# Patient Record
Sex: FEMALE | Race: WHITE | NOT HISPANIC OR LATINO | Employment: OTHER | URBAN - METROPOLITAN AREA
[De-identification: names, ages, dates, MRNs, and addresses within clinical notes are randomized per-mention and may not be internally consistent; named-entity substitution may affect disease eponyms.]

---

## 2017-02-27 PROBLEM — B00.9 HSV (HERPES SIMPLEX VIRUS) INFECTION: Status: ACTIVE | Noted: 2017-02-27

## 2017-02-27 PROBLEM — M94.9 DISORDER OF BONE AND CARTILAGE: Status: ACTIVE | Noted: 2017-02-27

## 2017-02-27 PROBLEM — M89.9 DISORDER OF BONE AND CARTILAGE: Status: ACTIVE | Noted: 2017-02-27

## 2017-02-27 PROBLEM — H26.9 CATARACT OF BOTH EYES, UNSPECIFIED CATARACT TYPE: Status: ACTIVE | Noted: 2017-02-27

## 2017-02-27 PROBLEM — N39.0 RECURRENT UTI: Status: ACTIVE | Noted: 2017-02-27

## 2017-02-27 PROCEDURE — 87086 URINE CULTURE/COLONY COUNT: CPT | Performed by: INTERNAL MEDICINE

## 2017-02-27 PROCEDURE — 81001 URINALYSIS AUTO W/SCOPE: CPT | Performed by: INTERNAL MEDICINE

## 2017-08-21 PROBLEM — H25.813 COMBINED FORMS OF AGE-RELATED CATARACT OF BOTH EYES: Status: ACTIVE | Noted: 2017-08-21

## 2018-05-29 PROCEDURE — 81001 URINALYSIS AUTO W/SCOPE: CPT | Performed by: INTERNAL MEDICINE

## 2018-05-29 PROCEDURE — 87086 URINE CULTURE/COLONY COUNT: CPT | Performed by: INTERNAL MEDICINE

## 2019-05-28 PROCEDURE — 81003 URINALYSIS AUTO W/O SCOPE: CPT | Performed by: INTERNAL MEDICINE

## 2020-06-16 ENCOUNTER — HOSPITAL ENCOUNTER (OUTPATIENT)
Dept: CT IMAGING | Age: 75
Discharge: HOME | End: 2020-06-16
Attending: INTERNAL MEDICINE

## 2020-06-16 DIAGNOSIS — E78.00 PURE HYPERCHOLESTEROLEMIA: ICD-10-CM

## 2020-06-16 PROCEDURE — 75571 CT HRT W/O DYE W/CA TEST: CPT

## 2020-08-06 PROCEDURE — 88305 TISSUE EXAM BY PATHOLOGIST: CPT | Performed by: INTERNAL MEDICINE

## 2021-01-27 PROBLEM — M54.81 CERVICO-OCCIPITAL NEURALGIA OF LEFT SIDE: Status: ACTIVE | Noted: 2021-01-27

## 2021-01-27 PROBLEM — M54.2 POSTERIOR NECK PAIN: Status: ACTIVE | Noted: 2021-01-27

## 2021-01-27 PROBLEM — M47.812 CERVICAL SPONDYLOSIS WITHOUT MYELOPATHY: Status: ACTIVE | Noted: 2021-01-27

## 2021-05-11 PROCEDURE — 88305 TISSUE EXAM BY PATHOLOGIST: CPT | Performed by: SURGERY

## 2021-06-07 PROBLEM — M31.6 TEMPORAL ARTERITIS (HCC): Status: ACTIVE | Noted: 2021-06-07

## 2021-06-07 PROBLEM — M54.81 CERVICO-OCCIPITAL NEURALGIA OF LEFT SIDE: Status: RESOLVED | Noted: 2021-01-27 | Resolved: 2021-06-07

## 2023-06-23 ENCOUNTER — LAB ENCOUNTER (OUTPATIENT)
Dept: LAB | Age: 78
End: 2023-06-23
Attending: ORTHOPAEDIC SURGERY
Payer: MEDICARE

## 2023-06-23 DIAGNOSIS — Z01.818 PRE-OP TESTING: ICD-10-CM

## 2023-06-23 LAB
ANTIBODY SCREEN: NEGATIVE
RH BLOOD TYPE: POSITIVE
RH BLOOD TYPE: POSITIVE

## 2023-06-23 PROCEDURE — 87641 MR-STAPH DNA AMP PROBE: CPT

## 2023-06-23 PROCEDURE — 36415 COLL VENOUS BLD VENIPUNCTURE: CPT

## 2023-06-23 PROCEDURE — 86850 RBC ANTIBODY SCREEN: CPT

## 2023-06-23 PROCEDURE — 86900 BLOOD TYPING SEROLOGIC ABO: CPT

## 2023-06-23 PROCEDURE — 86901 BLOOD TYPING SEROLOGIC RH(D): CPT

## 2023-06-23 RX ORDER — PREDNISONE 5 MG/1
5 TABLET ORAL DAILY
COMMUNITY

## 2023-06-23 RX ORDER — FAMOTIDINE 20 MG/1
20 TABLET, FILM COATED ORAL 2 TIMES DAILY
COMMUNITY
Start: 2023-03-31

## 2023-06-24 LAB — MRSA DNA SPEC QL NAA+PROBE: NEGATIVE

## 2023-06-27 ENCOUNTER — APPOINTMENT (OUTPATIENT)
Dept: GENERAL RADIOLOGY | Facility: HOSPITAL | Age: 78
End: 2023-06-27
Attending: ORTHOPAEDIC SURGERY
Payer: MEDICARE

## 2023-06-27 ENCOUNTER — HOSPITAL ENCOUNTER (OUTPATIENT)
Facility: HOSPITAL | Age: 78
Discharge: HOME HEALTH | End: 2023-06-29
Attending: ORTHOPAEDIC SURGERY | Admitting: ORTHOPAEDIC SURGERY
Payer: MEDICARE

## 2023-06-27 ENCOUNTER — ANESTHESIA EVENT (OUTPATIENT)
Dept: SURGERY | Facility: HOSPITAL | Age: 78
End: 2023-06-27
Payer: MEDICARE

## 2023-06-27 ENCOUNTER — ANESTHESIA (OUTPATIENT)
Dept: SURGERY | Facility: HOSPITAL | Age: 78
End: 2023-06-27
Payer: MEDICARE

## 2023-06-27 DIAGNOSIS — Z01.818 PRE-OP TESTING: Primary | ICD-10-CM

## 2023-06-27 PROCEDURE — C9113 INJ PANTOPRAZOLE SODIUM, VIA: HCPCS | Performed by: STUDENT IN AN ORGANIZED HEALTH CARE EDUCATION/TRAINING PROGRAM

## 2023-06-27 PROCEDURE — 88311 DECALCIFY TISSUE: CPT | Performed by: ORTHOPAEDIC SURGERY

## 2023-06-27 PROCEDURE — 0SRB04A REPLACEMENT OF LEFT HIP JOINT WITH CERAMIC ON POLYETHYLENE SYNTHETIC SUBSTITUTE, UNCEMENTED, OPEN APPROACH: ICD-10-PCS | Performed by: ORTHOPAEDIC SURGERY

## 2023-06-27 PROCEDURE — 76000 FLUOROSCOPY <1 HR PHYS/QHP: CPT | Performed by: ORTHOPAEDIC SURGERY

## 2023-06-27 PROCEDURE — 73501 X-RAY EXAM HIP UNI 1 VIEW: CPT | Performed by: ORTHOPAEDIC SURGERY

## 2023-06-27 PROCEDURE — 88305 TISSUE EXAM BY PATHOLOGIST: CPT | Performed by: ORTHOPAEDIC SURGERY

## 2023-06-27 DEVICE — ACTIS DUOFIX HIP PROSTHESIS (FEMORAL STEM 12/14 TAPER CEMENTLESS SIZE 7 STD COLLAR)  CE
Type: IMPLANTABLE DEVICE | Site: HIP | Status: FUNCTIONAL
Brand: ACTIS

## 2023-06-27 DEVICE — PINNACLE GRIPTION ACETABULAR SHELL SECTOR 50MM OD
Type: IMPLANTABLE DEVICE | Site: HIP | Status: FUNCTIONAL
Brand: PINNACLE GRIPTION

## 2023-06-27 DEVICE — TOTAL HIP W/POR CUP & COCR HD: Type: IMPLANTABLE DEVICE

## 2023-06-27 DEVICE — PINNACLE HIP SOLUTIONS ALTRX POLYETHYLENE ACETABULAR LINER +4 NEUTRAL 32MM ID 50MM OD
Type: IMPLANTABLE DEVICE | Site: HIP | Status: FUNCTIONAL
Brand: PINNACLE ALTRX

## 2023-06-27 DEVICE — BIOLOX DELTA CERAMIC FEMORAL HEAD 32MM DIA +5.0 12/14 TAPER
Type: IMPLANTABLE DEVICE | Site: HIP | Status: FUNCTIONAL
Brand: BIOLOX DELTA

## 2023-06-27 DEVICE — CERAMIC HEAD UPCHARGE: Type: IMPLANTABLE DEVICE

## 2023-06-27 RX ORDER — OXYCODONE HCL 10 MG/1
10 TABLET, FILM COATED, EXTENDED RELEASE ORAL ONCE
Status: COMPLETED | OUTPATIENT
Start: 2023-06-27 | End: 2023-06-27

## 2023-06-27 RX ORDER — ACETAMINOPHEN 500 MG
1000 TABLET ORAL ONCE
Status: COMPLETED | OUTPATIENT
Start: 2023-06-27 | End: 2023-06-27

## 2023-06-27 RX ORDER — MIDAZOLAM HYDROCHLORIDE 1 MG/ML
INJECTION INTRAMUSCULAR; INTRAVENOUS AS NEEDED
Status: DISCONTINUED | OUTPATIENT
Start: 2023-06-27 | End: 2023-06-27 | Stop reason: SURG

## 2023-06-27 RX ORDER — PHENYLEPHRINE HCL 10 MG/ML
VIAL (ML) INJECTION AS NEEDED
Status: DISCONTINUED | OUTPATIENT
Start: 2023-06-27 | End: 2023-06-27 | Stop reason: SURG

## 2023-06-27 RX ORDER — DIPHENHYDRAMINE HYDROCHLORIDE 50 MG/ML
12.5 INJECTION INTRAMUSCULAR; INTRAVENOUS EVERY 4 HOURS PRN
Status: DISCONTINUED | OUTPATIENT
Start: 2023-06-27 | End: 2023-06-29

## 2023-06-27 RX ORDER — ASPIRIN 81 MG/1
81 TABLET ORAL 2 TIMES DAILY
Status: DISCONTINUED | OUTPATIENT
Start: 2023-06-27 | End: 2023-06-29

## 2023-06-27 RX ORDER — CALCIUM CARBONATE-CHOLECALCIFEROL TAB 250 MG-125 UNIT 250-125 MG-UNIT
2 TAB ORAL
Status: DISCONTINUED | OUTPATIENT
Start: 2023-06-28 | End: 2023-06-29

## 2023-06-27 RX ORDER — CELECOXIB 200 MG/1
200 CAPSULE ORAL 2 TIMES DAILY
Status: DISCONTINUED | OUTPATIENT
Start: 2023-06-27 | End: 2023-06-29

## 2023-06-27 RX ORDER — TRANEXAMIC ACID 650 MG/1
1300 TABLET ORAL ONCE
Status: COMPLETED | OUTPATIENT
Start: 2023-06-27 | End: 2023-06-27

## 2023-06-27 RX ORDER — SODIUM CHLORIDE, SODIUM LACTATE, POTASSIUM CHLORIDE, CALCIUM CHLORIDE 600; 310; 30; 20 MG/100ML; MG/100ML; MG/100ML; MG/100ML
INJECTION, SOLUTION INTRAVENOUS CONTINUOUS
Status: DISCONTINUED | OUTPATIENT
Start: 2023-06-27 | End: 2023-06-27 | Stop reason: HOSPADM

## 2023-06-27 RX ORDER — HYDROMORPHONE HYDROCHLORIDE 1 MG/ML
0.4 INJECTION, SOLUTION INTRAMUSCULAR; INTRAVENOUS; SUBCUTANEOUS EVERY 5 MIN PRN
Status: DISCONTINUED | OUTPATIENT
Start: 2023-06-27 | End: 2023-06-27 | Stop reason: HOSPADM

## 2023-06-27 RX ORDER — HYDROMORPHONE HYDROCHLORIDE 1 MG/ML
0.2 INJECTION, SOLUTION INTRAMUSCULAR; INTRAVENOUS; SUBCUTANEOUS EVERY 5 MIN PRN
Status: DISCONTINUED | OUTPATIENT
Start: 2023-06-27 | End: 2023-06-27 | Stop reason: HOSPADM

## 2023-06-27 RX ORDER — POLYETHYLENE GLYCOL 3350 17 G/17G
17 POWDER, FOR SOLUTION ORAL DAILY PRN
Status: DISCONTINUED | OUTPATIENT
Start: 2023-06-27 | End: 2023-06-29

## 2023-06-27 RX ORDER — HYDROMORPHONE HYDROCHLORIDE 1 MG/ML
0.2 INJECTION, SOLUTION INTRAMUSCULAR; INTRAVENOUS; SUBCUTANEOUS EVERY 2 HOUR PRN
Status: DISCONTINUED | OUTPATIENT
Start: 2023-06-27 | End: 2023-06-28

## 2023-06-27 RX ORDER — SENNOSIDES 8.6 MG
17.2 TABLET ORAL NIGHTLY
Status: DISCONTINUED | OUTPATIENT
Start: 2023-06-27 | End: 2023-06-29

## 2023-06-27 RX ORDER — ONDANSETRON 2 MG/ML
INJECTION INTRAMUSCULAR; INTRAVENOUS AS NEEDED
Status: DISCONTINUED | OUTPATIENT
Start: 2023-06-27 | End: 2023-06-27 | Stop reason: SURG

## 2023-06-27 RX ORDER — ASPIRIN 81 MG/1
81 TABLET ORAL ONCE
Status: DISCONTINUED | OUTPATIENT
Start: 2023-06-27 | End: 2023-06-27 | Stop reason: HOSPADM

## 2023-06-27 RX ORDER — FOLIC ACID/VIT B COMPLEX AND C 400 MCG
1 TABLET ORAL DAILY
Status: DISCONTINUED | OUTPATIENT
Start: 2023-06-27 | End: 2023-06-29

## 2023-06-27 RX ORDER — SODIUM CHLORIDE, SODIUM LACTATE, POTASSIUM CHLORIDE, CALCIUM CHLORIDE 600; 310; 30; 20 MG/100ML; MG/100ML; MG/100ML; MG/100ML
INJECTION, SOLUTION INTRAVENOUS CONTINUOUS
Status: DISCONTINUED | OUTPATIENT
Start: 2023-06-27 | End: 2023-06-29

## 2023-06-27 RX ORDER — BUPIVACAINE HYDROCHLORIDE 7.5 MG/ML
INJECTION, SOLUTION INTRASPINAL AS NEEDED
Status: DISCONTINUED | OUTPATIENT
Start: 2023-06-27 | End: 2023-06-27 | Stop reason: SURG

## 2023-06-27 RX ORDER — DIPHENHYDRAMINE HCL 25 MG
25 CAPSULE ORAL EVERY 4 HOURS PRN
Status: DISCONTINUED | OUTPATIENT
Start: 2023-06-27 | End: 2023-06-29

## 2023-06-27 RX ORDER — OXYCODONE HYDROCHLORIDE 5 MG/1
5 TABLET ORAL EVERY 4 HOURS PRN
Status: DISCONTINUED | OUTPATIENT
Start: 2023-06-27 | End: 2023-06-28

## 2023-06-27 RX ORDER — FAMOTIDINE 20 MG/1
20 TABLET, FILM COATED ORAL 2 TIMES DAILY
Status: DISCONTINUED | OUTPATIENT
Start: 2023-06-27 | End: 2023-06-27

## 2023-06-27 RX ORDER — DOCUSATE SODIUM 100 MG/1
100 CAPSULE, LIQUID FILLED ORAL 2 TIMES DAILY
Status: DISCONTINUED | OUTPATIENT
Start: 2023-06-27 | End: 2023-06-29

## 2023-06-27 RX ORDER — HYDROMORPHONE HYDROCHLORIDE 1 MG/ML
0.6 INJECTION, SOLUTION INTRAMUSCULAR; INTRAVENOUS; SUBCUTANEOUS EVERY 5 MIN PRN
Status: DISCONTINUED | OUTPATIENT
Start: 2023-06-27 | End: 2023-06-27 | Stop reason: HOSPADM

## 2023-06-27 RX ORDER — HYDROMORPHONE HYDROCHLORIDE 1 MG/ML
0.4 INJECTION, SOLUTION INTRAMUSCULAR; INTRAVENOUS; SUBCUTANEOUS EVERY 2 HOUR PRN
Status: DISCONTINUED | OUTPATIENT
Start: 2023-06-27 | End: 2023-06-28

## 2023-06-27 RX ORDER — CEFAZOLIN SODIUM/WATER 2 G/20 ML
2 SYRINGE (ML) INTRAVENOUS ONCE
Status: COMPLETED | OUTPATIENT
Start: 2023-06-27 | End: 2023-06-27

## 2023-06-27 RX ORDER — ACYCLOVIR 400 MG/1
400 TABLET ORAL 2 TIMES DAILY
Status: DISCONTINUED | OUTPATIENT
Start: 2023-06-27 | End: 2023-06-29

## 2023-06-27 RX ORDER — FAMOTIDINE 10 MG/ML
20 INJECTION, SOLUTION INTRAVENOUS 2 TIMES DAILY
Status: DISCONTINUED | OUTPATIENT
Start: 2023-06-27 | End: 2023-06-29

## 2023-06-27 RX ORDER — ONDANSETRON 2 MG/ML
4 INJECTION INTRAMUSCULAR; INTRAVENOUS EVERY 6 HOURS PRN
Status: DISCONTINUED | OUTPATIENT
Start: 2023-06-27 | End: 2023-06-29

## 2023-06-27 RX ORDER — OXYCODONE HYDROCHLORIDE 5 MG/1
2.5 TABLET ORAL EVERY 4 HOURS PRN
Status: DISCONTINUED | OUTPATIENT
Start: 2023-06-27 | End: 2023-06-28

## 2023-06-27 RX ORDER — FAMOTIDINE 20 MG/1
20 TABLET, FILM COATED ORAL 2 TIMES DAILY
Status: DISCONTINUED | OUTPATIENT
Start: 2023-06-27 | End: 2023-06-29

## 2023-06-27 RX ORDER — CEFAZOLIN SODIUM/WATER 2 G/20 ML
2 SYRINGE (ML) INTRAVENOUS EVERY 8 HOURS
Status: COMPLETED | OUTPATIENT
Start: 2023-06-27 | End: 2023-06-28

## 2023-06-27 RX ORDER — VITAMIN E 268 MG
400 CAPSULE ORAL DAILY
Status: DISCONTINUED | OUTPATIENT
Start: 2023-06-27 | End: 2023-06-29

## 2023-06-27 RX ORDER — CELECOXIB 200 MG/1
200 CAPSULE ORAL ONCE
Status: COMPLETED | OUTPATIENT
Start: 2023-06-27 | End: 2023-06-27

## 2023-06-27 RX ORDER — METOCLOPRAMIDE HYDROCHLORIDE 5 MG/ML
10 INJECTION INTRAMUSCULAR; INTRAVENOUS EVERY 8 HOURS PRN
Status: DISCONTINUED | OUTPATIENT
Start: 2023-06-27 | End: 2023-06-27 | Stop reason: HOSPADM

## 2023-06-27 RX ORDER — ENEMA 19; 7 G/133ML; G/133ML
1 ENEMA RECTAL ONCE AS NEEDED
Status: DISCONTINUED | OUTPATIENT
Start: 2023-06-27 | End: 2023-06-29

## 2023-06-27 RX ORDER — NALOXONE HYDROCHLORIDE 0.4 MG/ML
80 INJECTION, SOLUTION INTRAMUSCULAR; INTRAVENOUS; SUBCUTANEOUS AS NEEDED
Status: DISCONTINUED | OUTPATIENT
Start: 2023-06-27 | End: 2023-06-27 | Stop reason: HOSPADM

## 2023-06-27 RX ORDER — METOCLOPRAMIDE HYDROCHLORIDE 5 MG/ML
10 INJECTION INTRAMUSCULAR; INTRAVENOUS EVERY 8 HOURS PRN
Status: DISCONTINUED | OUTPATIENT
Start: 2023-06-27 | End: 2023-06-29

## 2023-06-27 RX ORDER — ACETAMINOPHEN 500 MG
1000 TABLET ORAL EVERY 8 HOURS SCHEDULED
Status: DISCONTINUED | OUTPATIENT
Start: 2023-06-27 | End: 2023-06-29

## 2023-06-27 RX ORDER — MORPHINE SULFATE 4 MG/ML
2 INJECTION, SOLUTION INTRAMUSCULAR; INTRAVENOUS EVERY 10 MIN PRN
Status: DISCONTINUED | OUTPATIENT
Start: 2023-06-27 | End: 2023-06-27 | Stop reason: HOSPADM

## 2023-06-27 RX ORDER — MORPHINE SULFATE 4 MG/ML
4 INJECTION, SOLUTION INTRAMUSCULAR; INTRAVENOUS EVERY 10 MIN PRN
Status: DISCONTINUED | OUTPATIENT
Start: 2023-06-27 | End: 2023-06-27 | Stop reason: HOSPADM

## 2023-06-27 RX ORDER — ONDANSETRON 2 MG/ML
4 INJECTION INTRAMUSCULAR; INTRAVENOUS EVERY 6 HOURS PRN
Status: DISCONTINUED | OUTPATIENT
Start: 2023-06-27 | End: 2023-06-27 | Stop reason: HOSPADM

## 2023-06-27 RX ORDER — DIPHENHYDRAMINE HYDROCHLORIDE 50 MG/ML
25 INJECTION INTRAMUSCULAR; INTRAVENOUS ONCE AS NEEDED
Status: ACTIVE | OUTPATIENT
Start: 2023-06-27 | End: 2023-06-27

## 2023-06-27 RX ORDER — PROCHLORPERAZINE EDISYLATE 5 MG/ML
10 INJECTION INTRAMUSCULAR; INTRAVENOUS EVERY 6 HOURS PRN
Status: DISCONTINUED | OUTPATIENT
Start: 2023-06-27 | End: 2023-06-27 | Stop reason: ALTCHOICE

## 2023-06-27 RX ORDER — BISACODYL 10 MG
10 SUPPOSITORY, RECTAL RECTAL
Status: DISCONTINUED | OUTPATIENT
Start: 2023-06-27 | End: 2023-06-29

## 2023-06-27 RX ORDER — CALCIUM CARBONATE-CHOLECALCIFEROL TAB 250 MG-125 UNIT 250-125 MG-UNIT
1 TAB ORAL
Status: DISCONTINUED | OUTPATIENT
Start: 2023-06-27 | End: 2023-06-29

## 2023-06-27 RX ORDER — PREDNISONE 5 MG/1
5 TABLET ORAL DAILY
Status: DISCONTINUED | OUTPATIENT
Start: 2023-06-28 | End: 2023-06-29

## 2023-06-27 RX ORDER — ASCORBIC ACID 500 MG
500 TABLET ORAL DAILY
Status: DISCONTINUED | OUTPATIENT
Start: 2023-06-27 | End: 2023-06-29

## 2023-06-27 RX ORDER — MORPHINE SULFATE 10 MG/ML
6 INJECTION, SOLUTION INTRAMUSCULAR; INTRAVENOUS EVERY 10 MIN PRN
Status: DISCONTINUED | OUTPATIENT
Start: 2023-06-27 | End: 2023-06-27 | Stop reason: HOSPADM

## 2023-06-27 RX ADMIN — CEFAZOLIN SODIUM/WATER 2 G: 2 G/20 ML SYRINGE (ML) INTRAVENOUS at 12:22:00

## 2023-06-27 RX ADMIN — ONDANSETRON 4 MG: 2 INJECTION INTRAMUSCULAR; INTRAVENOUS at 12:27:00

## 2023-06-27 RX ADMIN — SODIUM CHLORIDE, SODIUM LACTATE, POTASSIUM CHLORIDE, CALCIUM CHLORIDE: 600; 310; 30; 20 INJECTION, SOLUTION INTRAVENOUS at 12:11:00

## 2023-06-27 RX ADMIN — PHENYLEPHRINE HCL 50 MCG: 10 MG/ML VIAL (ML) INJECTION at 12:50:00

## 2023-06-27 RX ADMIN — PHENYLEPHRINE HCL 50 MCG: 10 MG/ML VIAL (ML) INJECTION at 12:58:00

## 2023-06-27 RX ADMIN — SODIUM CHLORIDE, SODIUM LACTATE, POTASSIUM CHLORIDE, CALCIUM CHLORIDE: 600; 310; 30; 20 INJECTION, SOLUTION INTRAVENOUS at 13:50:00

## 2023-06-27 RX ADMIN — SODIUM CHLORIDE, SODIUM LACTATE, POTASSIUM CHLORIDE, CALCIUM CHLORIDE: 600; 310; 30; 20 INJECTION, SOLUTION INTRAVENOUS at 14:22:00

## 2023-06-27 RX ADMIN — PHENYLEPHRINE HCL 50 MCG: 10 MG/ML VIAL (ML) INJECTION at 12:42:00

## 2023-06-27 RX ADMIN — BUPIVACAINE HYDROCHLORIDE 1.5 ML: 7.5 INJECTION, SOLUTION INTRASPINAL at 12:22:00

## 2023-06-27 RX ADMIN — MIDAZOLAM HYDROCHLORIDE 1 MG: 1 INJECTION INTRAMUSCULAR; INTRAVENOUS at 12:17:00

## 2023-06-27 NOTE — OPERATIVE REPORT
[facility]    PATIENT'S NAME: Cinthya DEY   ATTENDING PHYSICIAN: Teddy Li MD   OPERATING PHYSICIAN: Teddy Li MD   PATIENT ACCOUNT#:   [id number]    LOCATION:   Room 2 A [facility]  MEDICAL RECORD #:   [id number]       YOB: 1945  ADMISSION DATE:       06/27/2023      OPERATION DATE:  06/27/2023    OPERATIVE REPORT      PREOPERATIVE DIAGNOSIS:  Left hip primary degenerative joint disease. POSTOPERATIVE DIAGNOSIS:  Left hip primary degenerative joint disease. PROCEDURE:  Left total hip arthroplasty. ASSISTANT:    1.   Seble Pham PA-C (PA helped with patient positioning, leg holding, prepping and draping, leg manipulation, implantation, and closure). 2.   SHAILESH Batista. ANESTHESIA:  Spinal with sedation. ESTIMATED BLOOD LOSS:  300 mL. BLOOD PRODUCTS:  None. SPECIMENS:  Femoral head. COMPLICATIONS:  None. IMPLANTS:  DePuy Actis size 7 standard collared stem; Warwick Gription acetabular shell 50 mm in diameter; +4 neutral polyethylene liner; and a Biolox Delta +5, 32 mm diameter ceramic femoral head. INDICATIONS:  Patient is a 58-year-old who has been suffering from progressive left hip pain not responsive to conservative measures, making it difficult for her to walk a few blocks or go up and down stairs. X-rays showed subchondral sclerosis, osteophytes, joint space narrowing. Based on the above factors, I recommended hip replacement. Risks and benefits were discussed. Major risks include, but are not limited to the following:  Bleeding, infection, blood clot, pulmonary embolus, leg length discrepancy, heterotopic ossification, pudendal nerve palsy, lateral calcaneus nerve injury, localized numbness, fracture, instability, continued hip pain, anesthetic risk, need for further surgery. All of her questions were answered and she expressed full understanding of my explanations and plan of care. Consent was obtained.     OPERATIVE TECHNIQUE:  The patient was met in the preoperative holding area and the correct site was identified with her assistance. She was then brought back to the operating room. Spinal was performed and she was sedated and placed on the Dalton table. Bony prominences were padded and limbs secured otherwise. Left hip was prepped and draped in the usual sterile fashion. Prior to beginning, a time-out was performed and preoperative antibiotics were infused. Direct anterior approach was utilized with a skin incision made with a #10 blade on the proximal anterolateral thigh. We dissected sharply down to the tensor fascia and incised that longitudinally and bluntly dissected into the Hunter-Chua interval.  Circumflex arteries were located and cauterized. A T-capsulotomy was performed with each limb of the capsule tagged with a #2 nonabsorbable suture. Femoral head and neck were exposed. Capsular releases were performed around the trochanters. Femoral neck cut was performed using fluoroscopy for guidance, and the head was extracted measuring 48 mm in diameter. Labrum was removed circumferentially. We began reaming with a 47 mm reamer and went up by 1 mm increments to a 50, using fluoroscopy for guidance and placed our 50 mm shell. We then placed a +4 neutral liner. We then turned our attention to the proximal femur. We externally rotated it as well as adducted and extended it. We performed further capsular releases to aid in visualizing the calcar. Box osteotome was used followed by initial broach and we went up by 1 size increments to a size 7. We calcar planed and trialed and found a good re-creation of leg length and offset with a standard neck and a +5 ceramic femoral head. Trial instrumentation from the femur was then removed and the true implants placed and reduced the hip again finding good parameters both fluoroscopically and dynamically, taking the hip through a range of motion to ensure stability. Layered closure was performed with the capsule closed over the implant using the tag sutures. The tensor fascia was closed with a running #2 nonabsorbable suture. A 2-0 Vicryl in interrupted fashion was used for the subcutaneous layer and staples for the skin. A sterile dressing was applied. The patient was then awakened, transferred to her cart, and taken to the recovery room in stable condition.      Dictated By Dani Posey MD  d: 06/27/2023 14:26:44  t: 06/27/2023 18:08:05  [facility] [id number]  RUBIO/

## 2023-06-27 NOTE — INTERVAL H&P NOTE
Pre-op Diagnosis: Left hip osteoarthritis    The above referenced H&P was reviewed by Khoa Bajwa MD on 6/27/2023, the patient was examined and no significant changes have occurred in the patient's condition since the H&P was performed. I discussed with the patient and/or legal representative the potential benefits, risks and side effects of this procedure; the likelihood of the patient achieving goals; and potential problems that might occur during recuperation. I discussed reasonable alternatives to the procedure, including risks, benefits and side effects related to the alternatives and risks related to not receiving this procedure. We will proceed with procedure as planned.

## 2023-06-27 NOTE — BRIEF OP NOTE
Pre-Operative Diagnosis: Left hip osteoarthritis     Post-Operative Diagnosis: Left hip osteoarthritis      Procedure Performed:   Left total hip replacement    Surgeon(s) and Role:     * Nidia Zepeda MD - Primary    Assistant(s):  Surgical Assistant.: Wilver Esparza  PA: Mariza Colon     Surgical Findings: As dictated     Specimen: femoral head     Estimated Blood Loss: Nisha Iglesias MD  6/27/2023  2:20 PM

## 2023-06-27 NOTE — CM/SW NOTE
AC followed up on DC planning. AC received message from [facility] stating pt is pre ortho with [facility] [phone]    Referral sent - orders/F2F entered    PLAN: Home with [address], [place], MSW ext.  [phone]

## 2023-06-27 NOTE — PROGRESS NOTES
Pharmacy Note: Dietary Supplement Discontinuation Per Policy    Vitamin A has been discontinued on Bettina Harris per policy. This supplement may be restarted upon discharge using the medication reconciliation process.     Thank you,   Silva Luo, PharmD  6/27/2023,  6:24 PM

## 2023-06-28 PROBLEM — Z01.818 PRE-OP TESTING: Status: RESOLVED | Noted: 2023-06-27 | Resolved: 2023-06-28

## 2023-06-28 PROBLEM — Z96.642 S/P HIP REPLACEMENT, LEFT: Status: ACTIVE | Noted: 2023-06-28

## 2023-06-28 LAB
ANION GAP SERPL CALC-SCNC: 5 MMOL/L (ref 0–18)
BUN BLD-MCNC: 10 MG/DL (ref 7–18)
BUN/CREAT SERPL: 20 (ref 10–20)
CALCIUM BLD-MCNC: 8.6 MG/DL (ref 8.5–10.1)
CHLORIDE SERPL-SCNC: 108 MMOL/L (ref 98–112)
CO2 SERPL-SCNC: 27 MMOL/L (ref 21–32)
CREAT BLD-MCNC: 0.5 MG/DL
DEPRECATED RDW RBC AUTO: 52.4 FL (ref 35.1–46.3)
ERYTHROCYTE [DISTWIDTH] IN BLOOD BY AUTOMATED COUNT: 14.6 % (ref 11–15)
GFR SERPLBLD BASED ON 1.73 SQ M-ARVRAT: 95 ML/MIN/1.73M2 (ref 60–?)
GLUCOSE BLD-MCNC: 110 MG/DL (ref 70–99)
HCT VFR BLD AUTO: 34.3 %
HGB BLD-MCNC: 11.3 G/DL
MCH RBC QN AUTO: 32.3 PG (ref 26–34)
MCHC RBC AUTO-ENTMCNC: 32.9 G/DL (ref 31–37)
MCV RBC AUTO: 98 FL
OSMOLALITY SERPL CALC.SUM OF ELEC: 290 MOSM/KG (ref 275–295)
PLATELET # BLD AUTO: 209 10(3)UL (ref 150–450)
POTASSIUM SERPL-SCNC: 4 MMOL/L (ref 3.5–5.1)
RBC # BLD AUTO: 3.5 X10(6)UL
SODIUM SERPL-SCNC: 140 MMOL/L (ref 136–145)
WBC # BLD AUTO: 8.8 X10(3) UL (ref 4–11)

## 2023-06-28 PROCEDURE — 85018 HEMOGLOBIN: CPT | Performed by: ORTHOPAEDIC SURGERY

## 2023-06-28 PROCEDURE — 97161 PT EVAL LOW COMPLEX 20 MIN: CPT

## 2023-06-28 PROCEDURE — 97116 GAIT TRAINING THERAPY: CPT

## 2023-06-28 PROCEDURE — 85027 COMPLETE CBC AUTOMATED: CPT | Performed by: STUDENT IN AN ORGANIZED HEALTH CARE EDUCATION/TRAINING PROGRAM

## 2023-06-28 PROCEDURE — 97166 OT EVAL MOD COMPLEX 45 MIN: CPT

## 2023-06-28 PROCEDURE — 97535 SELF CARE MNGMENT TRAINING: CPT

## 2023-06-28 PROCEDURE — 85014 HEMATOCRIT: CPT | Performed by: ORTHOPAEDIC SURGERY

## 2023-06-28 PROCEDURE — S0028 INJECTION, FAMOTIDINE, 20 MG: HCPCS | Performed by: ORTHOPAEDIC SURGERY

## 2023-06-28 PROCEDURE — 80048 BASIC METABOLIC PNL TOTAL CA: CPT | Performed by: ORTHOPAEDIC SURGERY

## 2023-06-28 RX ORDER — HYDROCODONE BITARTRATE AND ACETAMINOPHEN 5; 325 MG/1; MG/1
1 TABLET ORAL EVERY 12 HOURS PRN
Status: DISCONTINUED | OUTPATIENT
Start: 2023-06-28 | End: 2023-06-29

## 2023-06-28 RX ORDER — TRAMADOL HYDROCHLORIDE 50 MG/1
50 TABLET ORAL EVERY 6 HOURS PRN
Status: DISCONTINUED | OUTPATIENT
Start: 2023-06-28 | End: 2023-06-29

## 2023-06-28 NOTE — PROGRESS NOTES
06/28/23 1000   VISIT TYPE   OT Inpatient Visit Type (Documentation Required) Attempted Evaluation     OT orders received, chart reviewed. Pt cleared to participate by RN. 2 attempts made this morning to complete initial OT eval. Pt declined both  with c/o nausea and pain. Despite education and encouragement, pt refuses OOB activity. Nurse aware. Plan to re schedule for a later time today.

## 2023-06-28 NOTE — CM/SW NOTE
Jessica Soto confirmed acceptance for RN and PT services at [facility]. Agency reserved in [address] and notified of possible dc today. Plan: Home w/[facility] pending medical clearance.     Kristina Stephens, BSN    [phone]

## 2023-06-28 NOTE — PHYSICAL THERAPY NOTE
PHYSICAL THERAPY EVALUATION - INPATIENT     Room Number: Room 2/Room 2-A  Evaluation Date: 6/28/2023  Type of Evaluation: Initial        Presenting Problem:  (L AMAURY)  Reason for Therapy: Mobility Dysfunction and Discharge Planning    PHYSICAL THERAPY ASSESSMENT   Orders received and chart reviewed. RN approved participation in physical therapy. PPE worn by therapist: mask and gloves. Patient was wearing a mask during session. Patient is a 76year old female admitted 6/27/2023 for Presenting Problem:  (L AMAURY). Patient presented in bed with 8/10 pain. Education provided on Total Hip precautions, Weight bearing status, Physical therapy plan of care, and physiological benefits of out of bed mobility. Patient with fair carryover. Bed mobility: Min assist  Transfers: Supervision  Gait Assistance: Supervision  Distance (ft): 200  Assistive Device: Rolling walker  Pattern: L Circumduction, valgus deformity bilaterally, guarded gait pattern despite verbal cues for heel toe gait patter to improve left knee flexion          Patient was left in bed and bedside chair at end of session with all needs in reach. Patient's current functional deficits include The patient's Approx Degree of Impairment: 46.58% has been calculated based on documentation in the [facility] '6 clicks' Inpatient Basic Mobility Short Form. Research supports that patients with this level of impairment may benefit from Home with home health PT. RN aware of patient status post session. Patient will benefit from continued IP PT services to address these deficits in preparation for discharge. DISCHARGE RECOMMENDATIONS  PT Discharge Recommendations: Home with home health PT    PLAN  PT Treatment Plan: Bed mobility; Family education;Patient education;Gait training  Rehab Potential : Fair  Frequency (Obs): Daily       PHYSICAL THERAPY MEDICAL/SOCIAL HISTORY     History related to current admission:      Problem List  Active Problems:    S/P hip replacement, left      Past Medical History  Past Medical History:   Diagnosis Date    Congenital spondylolisthesis     DDD (degenerative disc disease), cervical 2015 MRI    Diverticulosis of colon (without mention of hemorrhage)     HSV infection     Hx of motion sickness     Immunization, single disease 06/22/2015    Prevnar 13    Osteoarthritis     Osteoarthrosis involving or with mention of more than one site, but not specified as generalized     Osteopenia DEXA 7/2016    -1.1, -1.8, -1.8    Screening for cardiovascular condition coronary calcium score at [facility]    score of 58; mild coronary artery plaque (score of 1-99)    Temporal arteritis ([address] 2021    Tinnitus        Past Surgical History  Past Surgical History:   Procedure Laterality Date    COLONOSCOPY  8/6    COLONOSCOPY  8/3/09    Performed by [facility] at [address] [place] [address]    COLONOSCOPY N/A 8/19/2014    Procedure: COLONOSCOPY, POSSIBLE BIOPSY, POSSIBLE POLYPECTOMY 18644;  Surgeon: Raul Vaughn MD;  Location: [address] [place] [address]    COLONOSCOPY  8/19/2014    repeat in 5 years (FH of colon CA)    COLONOSCOPY  08/2020    and EGD (per Dr. Gerson Gates)    EGD N/A 8/6/2020    Procedure: ESOPHAGOGASTRODUODENOSCOPY, COLONOSCOPY, POSSIBLE BIOPSY, POSSIBLE POLYPECTOMY 61464, 23036;  Surgeon: Raul Vaughn MD;  Location: [address] [place] [address]      and [facility]    OTHER SURGICAL HISTORY      h/o right knee arthroscopic surgery    REMOVAL OF TONSILS,10+ Y/O      TOTAL ABDOM HYSTERECTOMY         HOME SITUATION  Type of Home: House   Home Layout: Multi-level; Able to live on main level (3 JALYN no rails)              Lives With: Alone (dtr to stay with pt \"as long as necessary\")  Drives: Yes     Patient Regularly Uses: None    Prior Level of [place]: ind    SUBJECTIVE  \"I don't feel good\"    PHYSICAL THERAPY EXAMINATION     OBJECTIVE  Precautions: AMAURY - anterior  Fall Risk: Standard fall risk    WEIGHT BEARING RESTRICTION  Weight Bearing Restriction: L lower extremity           L Lower Extremity: Weight Bearing as Tolerated    PAIN ASSESSMENT  Ratin          COGNITION  Overall Cognitive Status:  WFL - within functional limits    RANGE OF MOTION AND STRENGTH ASSESSMENT  Upper extremity ROM and strength are within functional limits     Lower extremity ROM is within functional limits     Lower extremity strength is within functional limits     BALANCE  Static Sitting: Good  Dynamic Sitting: Good  Static Standing: Fair -  Dynamic [place] 2 '6-Clicks' INPATIENT SHORT FORM - BASIC MOBILITY  How much difficulty does the patient currently have. .. Patient Difficulty: Turning over in bed (including adjusting bedclothes, sheets and blankets)?: A Little   Patient Difficulty: Sitting down on and standing up from a chair with arms (e.g., wheelchair, bedside commode, etc.): A Little   Patient Difficulty: Moving from lying on back to sitting on the side of the bed?: A Little   How much help from another person does the patient currently need. ..    Help from Another: Moving to and from a bed to a chair (including a wheelchair)?: A Little   Help from Another: Need to walk in hospital room?: A Little   Help from Another: Climbing 3-5 steps with a railing?: A Little     AM-PAC Score:  Raw Score: 18   Approx Degree of Impairment: 46.58%   Standardized Score (AM-PAC Scale): 43.63   CMS Modifier (G-Code): CK      Exercise/Education Provided:  Bed mobility  Gait training  Transfer training    Patient End of Session: Up in chair    CURRENT GOALS    Goals to be met by:   Patient Goal Patient's self-stated goal is: home   Goal #1 Patient is able to demonstrate supine - sit EOB @ level: supervision     Goal #1   Current Status    Goal #2 Patient is able to demonstrate transfers Sit to/from Stand at assistance level: supervision with walker - rolling     Goal #2  Current Status    Goal #3 Patient is able to ambulate 300 feet with assist device: walker - rolling at assistance level: supervision   Goal #3   Current Status    Goal #4 Patient will negotiate 3 stairs/one curb w/ assistive device and supervision   Goal #4   Current Status    Goal #5 Patient to demonstrate independence with home activity/exercise instructions provided to patient in preparation for discharge.    Goal #5   Current Status    Goal #6    Goal #6  Current Status          Patient Evaluation Complexity Level:  History Low - no personal factors and/or co-morbidities   Examination of body systems Low - addressing 1-2 elements   Clinical Presentation Low - Stable   Clinical Decision Making Low Complexity       Gait Trainin minutes  Therapeutic Exercise: 5 minutes

## 2023-06-29 VITALS
HEART RATE: 100 BPM | BODY MASS INDEX: 21.51 KG/M2 | OXYGEN SATURATION: 98 % | DIASTOLIC BLOOD PRESSURE: 43 MMHG | SYSTOLIC BLOOD PRESSURE: 131 MMHG | WEIGHT: 126 LBS | TEMPERATURE: 98 F | HEIGHT: 64 IN | RESPIRATION RATE: 18 BRPM

## 2023-06-29 PROCEDURE — 97116 GAIT TRAINING THERAPY: CPT

## 2023-06-29 PROCEDURE — 97535 SELF CARE MNGMENT TRAINING: CPT

## 2023-06-29 PROCEDURE — 97530 THERAPEUTIC ACTIVITIES: CPT

## 2023-06-29 RX ORDER — CELECOXIB 200 MG/1
200 CAPSULE ORAL 2 TIMES DAILY
Qty: 60 CAPSULE | Refills: 0 | Status: SHIPPED | OUTPATIENT
Start: 2023-06-29 | End: 2023-07-29

## 2023-06-29 RX ORDER — ASPIRIN 81 MG/1
81 TABLET ORAL 2 TIMES DAILY
Qty: 42 TABLET | Refills: 0 | Status: SHIPPED | OUTPATIENT
Start: 2023-06-29 | End: 2023-07-20

## 2023-06-29 RX ORDER — ACETAMINOPHEN 500 MG
1000 TABLET ORAL 3 TIMES DAILY PRN
Qty: 42 TABLET | Refills: 0 | Status: SHIPPED | OUTPATIENT
Start: 2023-06-29 | End: 2023-07-06

## 2023-06-29 RX ORDER — TAMSULOSIN HYDROCHLORIDE 0.4 MG/1
0.4 CAPSULE ORAL DAILY
Qty: 7 CAPSULE | Refills: 0 | Status: SHIPPED | OUTPATIENT
Start: 2023-06-29

## 2023-06-29 RX ORDER — TRAMADOL HYDROCHLORIDE 50 MG/1
TABLET ORAL EVERY 8 HOURS PRN
Qty: 21 TABLET | Refills: 0 | Status: SHIPPED | OUTPATIENT
Start: 2023-06-29 | End: 2023-07-06

## 2023-06-29 NOTE — PROGRESS NOTES
Contact by nursing over EPIC, patient cleared by PT and is appropriate for discharge home with home health. Tylenol and tramadol for pain control as she did not do well with Norco or Percocet. ASA for DVT proph  Celebrex  Follow up with my PA Lionel Tavarez in approximately 2 weeks  I will try to see the patient around 1p, but if she is ready and doing well, can be discharged earlier.

## 2023-06-29 NOTE — PHYSICAL THERAPY NOTE
PHYSICAL THERAPY TREATMENT NOTE - INPATIENT     Room Number: Room 2/Room 2-A       Presenting Problem:  (L AMAURY)    Problem List  Active Problems:    S/P hip replacement, left      PHYSICAL THERAPY ASSESSMENT   Chart reviewed. RN Karlo Pan approved participation in physical therapy. Seb Lipoma PPE worn by therapist: mask and gloves. Patient was not wearing a mask during session. Patient presented in bedside chair with 6/10 pain. Patient with good  progress towards goals during this session. Education provided on Total Hip precautions, Physical therapy plan of care, and physiological benefits of out of bed mobility. Patient with good carryover. Pt is received in the chair and was cleared for therapy session. Pt is SBA with sit<>stand transfers with the RW Pt was cued for proper hand placement for safety. Pt was able to AMB about 100' with the RW SBA. Pt with slow gait and decreased step length with narrow CHACE but with good balance and safety awareness. Pt AMB to the stairs for stair training. Pt was educated and able to negotiate 4 stairs with 2 HR's CGA. Pt is cued for proper technique and safety awareness. Pt with good balance on the stairs. Returned pt back to the room and to sitting in the chair with all needs within reach. Pt is on track to dc to home once medically cleared. Bed mobility:  NT  Transfers: Supervision  Gait Assistance: Supervision  Distance (ft): 100'  Assistive Device: Rolling walker  Pattern: L Decreased stance time;L Circumduction        . Patient was left in bedside chair at end of session with all needs in reach. The patient's Approx Degree of Impairment: 46.58% has been calculated based on documentation in the [facility] '6 clicks' Inpatient Basic Mobility Short Form. Research supports that patients with this level of impairment may benefit from Home with home health PT. RN aware of patient status post session.     DISCHARGE RECOMMENDATIONS  PT Discharge Recommendations: Home with home health PT;24 hour care/supervision     PLAN  PT Treatment Plan: Bed mobility; Family education;Patient education;Gait training    SUBJECTIVE  Pt was agreeable to therapy session. OBJECTIVE  Precautions: AMAURY - anterior    WEIGHT BEARING RESTRICTION  Weight Bearing Restriction: L lower extremity           L Lower Extremity: Weight Bearing as Tolerated    PAIN ASSESSMENT   Ratin  Location: L hip  Management Techniques: Activity promotion; Body mechanics; Relaxation;Repositioning    BALANCE                                                                                                                       Static Sitting: Good  Dynamic Sitting: Fair +           Static Standing: Fair  Dynamic Standing: Fair -    ACTIVITY TOLERANCE                         O2 WALK       AM-PAC '6-Clicks' INPATIENT SHORT FORM - BASIC MOBILITY  How much difficulty does the patient currently have. .. Patient Difficulty: Turning over in bed (including adjusting bedclothes, sheets and blankets)?: A Little   Patient Difficulty: Sitting down on and standing up from a chair with arms (e.g., wheelchair, bedside commode, etc.): A Little   Patient Difficulty: Moving from lying on back to sitting on the side of the bed?: A Little   How much help from another person does the patient currently need. .. Help from Another: Moving to and from a bed to a chair (including a wheelchair)?: A Little   Help from Another: Need to walk in hospital room?: A Little   Help from Another: Climbing 3-5 steps with a railing?: A Little     AM-PAC Score:  Raw Score: 18   Approx Degree of Impairment: 46.58%   Standardized Score (AM-PAC Scale): 43.63   CMS Modifier (G-Code): CK          Patient End of Session: Up in chair;Needs met;Call light within reach;RN aware of session/findings; All patient questions and concerns addressed    CURRENT GOALS   Goals to be met by:   Patient Goal Patient's self-stated goal is: home   Goal #1 Patient is able to demonstrate supine - sit EOB @ level: supervision     Goal #1   Current Status NT received in the chair   Goal #2 Patient is able to demonstrate transfers Sit to/from Stand at assistance level: supervision with walker - rolling     Goal #2  Current Status SBA with the RW   Goal #3 Patient is able to ambulate 300 feet with assist device: walker - rolling at assistance level: supervision   Goal #3   Current Status 100' with the RW SBA   Goal #4 Patient will negotiate 3 stairs/one curb w/ assistive device and supervision   Goal #4   Current Status 4 stairs with 2 HR's CGA   Goal #5 Patient to demonstrate independence with home activity/exercise instructions provided to patient in preparation for discharge.    Goal #5   Current Status IN PROGRESS   Goal #6    Goal #6  Current Status        Gait Training: 10 minutes  Therapeutic Activity: 15 minutes

## 2024-06-25 ENCOUNTER — IMAGING SERVICES (OUTPATIENT)
Dept: OTHER | Age: 79
End: 2024-06-25

## 2024-07-03 ENCOUNTER — IMAGING SERVICES (OUTPATIENT)
Dept: OTHER | Age: 79
End: 2024-07-03

## 2024-11-11 NOTE — CM/SW NOTE
SW was notified the pt. Has been pre-operatively arranged with [facility].    [facility] will continue to follow.     LULU Loja ext [phone]

## 2024-11-12 RX ORDER — CARVEDILOL 6.25 MG/1
6.25 TABLET ORAL 2 TIMES DAILY
COMMUNITY
Start: 2024-08-08 | End: 2025-02-04

## 2024-11-14 ENCOUNTER — APPOINTMENT (OUTPATIENT)
Dept: GENERAL RADIOLOGY | Facility: HOSPITAL | Age: 79
End: 2024-11-14
Attending: PHYSICIAN ASSISTANT
Payer: MEDICARE

## 2024-11-14 ENCOUNTER — ANESTHESIA EVENT (OUTPATIENT)
Dept: SURGERY | Facility: HOSPITAL | Age: 79
End: 2024-11-14
Payer: MEDICARE

## 2024-11-14 ENCOUNTER — HOSPITAL ENCOUNTER (OUTPATIENT)
Facility: HOSPITAL | Age: 79
Setting detail: HOSPITAL OUTPATIENT SURGERY
Discharge: HOME | End: 2024-11-14
Attending: ORTHOPAEDIC SURGERY | Admitting: ORTHOPAEDIC SURGERY
Payer: MEDICARE

## 2024-11-14 ENCOUNTER — ANESTHESIA (OUTPATIENT)
Dept: SURGERY | Facility: HOSPITAL | Age: 79
End: 2024-11-14
Payer: MEDICARE

## 2024-11-14 VITALS
HEART RATE: 116 BPM | TEMPERATURE: 97 F | BODY MASS INDEX: 23.9 KG/M2 | SYSTOLIC BLOOD PRESSURE: 160 MMHG | HEIGHT: 64 IN | RESPIRATION RATE: 20 BRPM | OXYGEN SATURATION: 97 % | DIASTOLIC BLOOD PRESSURE: 78 MMHG | WEIGHT: 140 LBS

## 2024-11-14 PROCEDURE — 97530 THERAPEUTIC ACTIVITIES: CPT

## 2024-11-14 PROCEDURE — 73560 X-RAY EXAM OF KNEE 1 OR 2: CPT | Performed by: PHYSICIAN ASSISTANT

## 2024-11-14 PROCEDURE — 0SRC0J9 REPLACEMENT OF RIGHT KNEE JOINT WITH SYNTHETIC SUBSTITUTE, CEMENTED, OPEN APPROACH: ICD-10-PCS | Performed by: ORTHOPAEDIC SURGERY

## 2024-11-14 PROCEDURE — 97165 OT EVAL LOW COMPLEX 30 MIN: CPT

## 2024-11-14 PROCEDURE — 97116 GAIT TRAINING THERAPY: CPT

## 2024-11-14 PROCEDURE — 97161 PT EVAL LOW COMPLEX 20 MIN: CPT

## 2024-11-14 PROCEDURE — 64447 NJX AA&/STRD FEMORAL NRV IMG: CPT | Performed by: ORTHOPAEDIC SURGERY

## 2024-11-14 PROCEDURE — 88311 DECALCIFY TISSUE: CPT | Performed by: ORTHOPAEDIC SURGERY

## 2024-11-14 PROCEDURE — 97535 SELF CARE MNGMENT TRAINING: CPT

## 2024-11-14 PROCEDURE — 88305 TISSUE EXAM BY PATHOLOGIST: CPT | Performed by: ORTHOPAEDIC SURGERY

## 2024-11-14 PROCEDURE — 8E0Y0CZ ROBOTIC ASSISTED PROCEDURE OF LOWER EXTREMITY, OPEN APPROACH: ICD-10-PCS | Performed by: ORTHOPAEDIC SURGERY

## 2024-11-14 DEVICE — IMPLANTABLE DEVICE
Type: IMPLANTABLE DEVICE | Site: KNEE | Status: FUNCTIONAL
Brand: BIOMET® BONE CEMENT R

## 2024-11-14 DEVICE — IMPLANTABLE DEVICE
Type: IMPLANTABLE DEVICE | Site: KNEE | Status: FUNCTIONAL
Brand: PERSONA®

## 2024-11-14 DEVICE — IMPLANTABLE DEVICE
Type: IMPLANTABLE DEVICE | Site: KNEE | Status: FUNCTIONAL
Brand: PERSONA® VIVACIT-E®

## 2024-11-14 DEVICE — IMPLANTABLE DEVICE
Type: IMPLANTABLE DEVICE | Site: KNEE | Status: FUNCTIONAL
Brand: PERSONA® NATURAL TIBIA®

## 2024-11-14 RX ORDER — ACETAMINOPHEN 500 MG
1000 TABLET ORAL ONCE
Status: DISCONTINUED | OUTPATIENT
Start: 2024-11-14 | End: 2024-11-14 | Stop reason: HOSPADM

## 2024-11-14 RX ORDER — DIPHENHYDRAMINE HYDROCHLORIDE 50 MG/ML
12.5 INJECTION INTRAMUSCULAR; INTRAVENOUS EVERY 4 HOURS PRN
OUTPATIENT
Start: 2024-11-14

## 2024-11-14 RX ORDER — DOCUSATE SODIUM 100 MG/1
100 CAPSULE, LIQUID FILLED ORAL 2 TIMES DAILY
OUTPATIENT
Start: 2024-11-14

## 2024-11-14 RX ORDER — BUPIVACAINE HYDROCHLORIDE 7.5 MG/ML
INJECTION, SOLUTION INTRASPINAL AS NEEDED
Status: DISCONTINUED | OUTPATIENT
Start: 2024-11-14 | End: 2024-11-14 | Stop reason: SURG

## 2024-11-14 RX ORDER — ONDANSETRON 2 MG/ML
INJECTION INTRAMUSCULAR; INTRAVENOUS AS NEEDED
Status: DISCONTINUED | OUTPATIENT
Start: 2024-11-14 | End: 2024-11-14 | Stop reason: SURG

## 2024-11-14 RX ORDER — LIDOCAINE HYDROCHLORIDE 10 MG/ML
INJECTION, SOLUTION INFILTRATION; PERINEURAL
Status: COMPLETED | OUTPATIENT
Start: 2024-11-14 | End: 2024-11-14

## 2024-11-14 RX ORDER — HYDROMORPHONE HYDROCHLORIDE 1 MG/ML
0.4 INJECTION, SOLUTION INTRAMUSCULAR; INTRAVENOUS; SUBCUTANEOUS EVERY 5 MIN PRN
Status: DISCONTINUED | OUTPATIENT
Start: 2024-11-14 | End: 2024-11-14

## 2024-11-14 RX ORDER — DIPHENHYDRAMINE HCL 25 MG
25 CAPSULE ORAL EVERY 4 HOURS PRN
OUTPATIENT
Start: 2024-11-14

## 2024-11-14 RX ORDER — EPHEDRINE SULFATE 50 MG/ML
INJECTION INTRAVENOUS AS NEEDED
Status: DISCONTINUED | OUTPATIENT
Start: 2024-11-14 | End: 2024-11-14 | Stop reason: SURG

## 2024-11-14 RX ORDER — MORPHINE SULFATE 4 MG/ML
4 INJECTION, SOLUTION INTRAMUSCULAR; INTRAVENOUS EVERY 10 MIN PRN
Status: DISCONTINUED | OUTPATIENT
Start: 2024-11-14 | End: 2024-11-14

## 2024-11-14 RX ORDER — TRANEXAMIC ACID 650 MG/1
1300 TABLET ORAL ONCE
Status: COMPLETED | OUTPATIENT
Start: 2024-11-14 | End: 2024-11-14

## 2024-11-14 RX ORDER — FAMOTIDINE 20 MG/1
20 TABLET, FILM COATED ORAL 2 TIMES DAILY
OUTPATIENT
Start: 2024-11-14

## 2024-11-14 RX ORDER — BISACODYL 10 MG
10 SUPPOSITORY, RECTAL RECTAL
OUTPATIENT
Start: 2024-11-14

## 2024-11-14 RX ORDER — HYDROCODONE BITARTRATE AND ACETAMINOPHEN 10; 325 MG/1; MG/1
1 TABLET ORAL
COMMUNITY
Start: 2024-11-04

## 2024-11-14 RX ORDER — MORPHINE SULFATE 15 MG/1
15 TABLET ORAL EVERY 4 HOURS PRN
OUTPATIENT
Start: 2024-11-14 | End: 2024-11-15

## 2024-11-14 RX ORDER — SENNOSIDES 8.6 MG
17.2 TABLET ORAL NIGHTLY
OUTPATIENT
Start: 2024-11-14

## 2024-11-14 RX ORDER — ONDANSETRON 2 MG/ML
4 INJECTION INTRAMUSCULAR; INTRAVENOUS EVERY 6 HOURS PRN
OUTPATIENT
Start: 2024-11-14

## 2024-11-14 RX ORDER — SODIUM PHOSPHATE, DIBASIC AND SODIUM PHOSPHATE, MONOBASIC 7; 19 G/230ML; G/230ML
1 ENEMA RECTAL ONCE AS NEEDED
OUTPATIENT
Start: 2024-11-14

## 2024-11-14 RX ORDER — DIPHENHYDRAMINE HYDROCHLORIDE 50 MG/ML
25 INJECTION INTRAMUSCULAR; INTRAVENOUS ONCE AS NEEDED
OUTPATIENT
Start: 2024-11-14 | End: 2024-11-14

## 2024-11-14 RX ORDER — OXYCODONE HYDROCHLORIDE 5 MG/1
10 TABLET ORAL EVERY 4 HOURS PRN
OUTPATIENT
Start: 2024-11-14 | End: 2024-11-15

## 2024-11-14 RX ORDER — SODIUM CHLORIDE, SODIUM LACTATE, POTASSIUM CHLORIDE, CALCIUM CHLORIDE 600; 310; 30; 20 MG/100ML; MG/100ML; MG/100ML; MG/100ML
INJECTION, SOLUTION INTRAVENOUS CONTINUOUS
Status: DISCONTINUED | OUTPATIENT
Start: 2024-11-14 | End: 2024-11-14

## 2024-11-14 RX ORDER — METOCLOPRAMIDE HYDROCHLORIDE 5 MG/ML
10 INJECTION INTRAMUSCULAR; INTRAVENOUS EVERY 8 HOURS PRN
OUTPATIENT
Start: 2024-11-14

## 2024-11-14 RX ORDER — ACETAMINOPHEN 500 MG
1000 TABLET ORAL EVERY 8 HOURS
OUTPATIENT
Start: 2024-11-14 | End: 2024-11-15

## 2024-11-14 RX ORDER — ASPIRIN 81 MG/1
81 TABLET ORAL 2 TIMES DAILY
OUTPATIENT
Start: 2024-11-14 | End: 2024-12-26

## 2024-11-14 RX ORDER — METOPROLOL TARTRATE 25 MG/1
25 TABLET, FILM COATED ORAL ONCE AS NEEDED
Status: DISCONTINUED | OUTPATIENT
Start: 2024-11-14 | End: 2024-11-14 | Stop reason: HOSPADM

## 2024-11-14 RX ORDER — HYDROMORPHONE HYDROCHLORIDE 1 MG/ML
0.2 INJECTION, SOLUTION INTRAMUSCULAR; INTRAVENOUS; SUBCUTANEOUS EVERY 5 MIN PRN
Status: DISCONTINUED | OUTPATIENT
Start: 2024-11-14 | End: 2024-11-14

## 2024-11-14 RX ORDER — MORPHINE SULFATE 10 MG/ML
6 INJECTION, SOLUTION INTRAMUSCULAR; INTRAVENOUS EVERY 10 MIN PRN
Status: DISCONTINUED | OUTPATIENT
Start: 2024-11-14 | End: 2024-11-14

## 2024-11-14 RX ORDER — MORPHINE SULFATE 4 MG/ML
6 INJECTION, SOLUTION INTRAMUSCULAR; INTRAVENOUS EVERY 2 HOUR PRN
OUTPATIENT
Start: 2024-11-14 | End: 2024-11-15

## 2024-11-14 RX ORDER — HYDROCORTISONE SODIUM SUCCINATE 100 MG/2ML
50 INJECTION INTRAMUSCULAR; INTRAVENOUS
Status: COMPLETED | OUTPATIENT
Start: 2024-11-14 | End: 2024-11-14

## 2024-11-14 RX ORDER — HYDROMORPHONE HYDROCHLORIDE 1 MG/ML
0.6 INJECTION, SOLUTION INTRAMUSCULAR; INTRAVENOUS; SUBCUTANEOUS EVERY 5 MIN PRN
Status: DISCONTINUED | OUTPATIENT
Start: 2024-11-14 | End: 2024-11-14

## 2024-11-14 RX ORDER — ROPIVACAINE HYDROCHLORIDE 5 MG/ML
INJECTION, SOLUTION EPIDURAL; INFILTRATION; PERINEURAL
Status: COMPLETED | OUTPATIENT
Start: 2024-11-14 | End: 2024-11-14

## 2024-11-14 RX ORDER — MORPHINE SULFATE 4 MG/ML
4 INJECTION, SOLUTION INTRAMUSCULAR; INTRAVENOUS EVERY 2 HOUR PRN
OUTPATIENT
Start: 2024-11-14 | End: 2024-11-15

## 2024-11-14 RX ORDER — MORPHINE SULFATE 4 MG/ML
2 INJECTION, SOLUTION INTRAMUSCULAR; INTRAVENOUS EVERY 2 HOUR PRN
OUTPATIENT
Start: 2024-11-14 | End: 2024-11-15

## 2024-11-14 RX ORDER — HYDROCORTISONE SODIUM SUCCINATE 100 MG/2ML
25 INJECTION INTRAMUSCULAR; INTRAVENOUS EVERY 8 HOURS
OUTPATIENT
Start: 2024-11-14 | End: 2024-11-15

## 2024-11-14 RX ORDER — MORPHINE SULFATE 4 MG/ML
2 INJECTION, SOLUTION INTRAMUSCULAR; INTRAVENOUS EVERY 10 MIN PRN
Status: DISCONTINUED | OUTPATIENT
Start: 2024-11-14 | End: 2024-11-14

## 2024-11-14 RX ORDER — POLYETHYLENE GLYCOL 3350 17 G/17G
17 POWDER, FOR SOLUTION ORAL DAILY PRN
OUTPATIENT
Start: 2024-11-14

## 2024-11-14 RX ORDER — NALOXONE HYDROCHLORIDE 0.4 MG/ML
0.08 INJECTION, SOLUTION INTRAMUSCULAR; INTRAVENOUS; SUBCUTANEOUS AS NEEDED
Status: DISCONTINUED | OUTPATIENT
Start: 2024-11-14 | End: 2024-11-14

## 2024-11-14 RX ORDER — HYDROCODONE BITARTRATE AND ACETAMINOPHEN 10; 325 MG/1; MG/1
1 TABLET ORAL EVERY 6 HOURS PRN
Status: DISCONTINUED | OUTPATIENT
Start: 2024-11-14 | End: 2024-11-14

## 2024-11-14 RX ORDER — FAMOTIDINE 10 MG/ML
20 INJECTION, SOLUTION INTRAVENOUS 2 TIMES DAILY
OUTPATIENT
Start: 2024-11-14

## 2024-11-14 RX ORDER — OXYCODONE HYDROCHLORIDE 5 MG/1
5 TABLET ORAL EVERY 4 HOURS PRN
OUTPATIENT
Start: 2024-11-14 | End: 2024-11-15

## 2024-11-14 RX ORDER — DEXAMETHASONE SODIUM PHOSPHATE 10 MG/ML
INJECTION, SOLUTION INTRAMUSCULAR; INTRAVENOUS
Status: COMPLETED | OUTPATIENT
Start: 2024-11-14 | End: 2024-11-14

## 2024-11-14 RX ADMIN — ROPIVACAINE HYDROCHLORIDE 30 ML: 5 INJECTION, SOLUTION EPIDURAL; INFILTRATION; PERINEURAL at 09:05:00

## 2024-11-14 RX ADMIN — DEXAMETHASONE SODIUM PHOSPHATE 10 MG: 10 INJECTION, SOLUTION INTRAMUSCULAR; INTRAVENOUS at 09:05:00

## 2024-11-14 RX ADMIN — LIDOCAINE HYDROCHLORIDE 5 ML: 10 INJECTION, SOLUTION INFILTRATION; PERINEURAL at 09:05:00

## 2024-11-14 RX ADMIN — BUPIVACAINE HYDROCHLORIDE 1.5 ML: 7.5 INJECTION, SOLUTION INTRASPINAL at 09:32:00

## 2024-11-14 RX ADMIN — ONDANSETRON 4 MG: 2 INJECTION INTRAMUSCULAR; INTRAVENOUS at 09:43:00

## 2024-11-14 RX ADMIN — EPHEDRINE SULFATE 5 MG: 50 INJECTION INTRAVENOUS at 09:39:00

## 2024-11-14 NOTE — ANESTHESIA PROCEDURE NOTES
Peripheral Block    Date/Time: 11/14/2024 9:05 AM    Performed by: Mahad Patel MD  Authorized by: Mahad Patel MD      General Information and Staff    Start Time:  11/14/2024 9:05 AM  End Time:  11/14/2024 9:14 AM  Anesthesiologist:  Khanh Duckworth MD  Performed by:  Anesthesiologist  Patient Location:  PACU    Block Placement: Pre Induction  Site Identification: real time ultrasound guided, surface landmarks and image stored and retrievable    Block site/laterality marked before start: site marked  Reason for Block: at surgeon's request, post-op pain management and surgical anesthesia    Preanesthetic Checklist: 2 patient identifers, IV checked, risks and benefits discussed, monitors and equipment checked, pre-op evaluation, timeout performed, anesthesia consent, sterile technique used, no prohibitive neurological deficits and no local skin infection at insertion site      Procedure Details    Patient Position:  Supine  Prep: ChloraPrep and patient draped    Monitoring:  Heart rate, cardiac monitor, continuous pulse ox and blood pressure cuff  Block Type:  Adductor canal  Laterality:  Right  Injection Technique:  Single-shot    Needle    Needle Type:  Echogenic  Needle Gauge:  22 G  Needle Length:  90 mm  Needle Localization:  Anatomical landmarks and ultrasound guidance  Reason for Ultrasound Use: appropriate spread of the medication was noted in real time and no ultrasound evidence of intravascular and/or intraneural injection          Needle Insertion Depth:  4    Assessment    Injection Assessment:  Good spread noted, incremental injection, local visualized surrounding nerve on ultrasound, low pressure, negative aspiration for heme, negative resistance and no pain on injection  Paresthesia Pain:  None  Heart Rate Change: No    - Patient tolerated block procedure well without evidence of immediate block related complications.     Medications  11/14/2024 9:05 AM  lidocaine injection 1% -  Intradermal   5 mL - 11/14/2024 9:05:00 AM  ropivacaine (NAROPIN) injection 0.5% - Infiltration   30 mL - 11/14/2024 9:05:00 AM  dexamethasone (DECADRON) PF injection 10 mg/ml - Injection   10 mg - 11/14/2024 9:05:00 AM    Additional Comments

## 2024-11-14 NOTE — ANESTHESIA PREPROCEDURE EVALUATION
Anesthesia PreOp Note    HPI:     Bettina Harris is a 79 year old female who presents for preoperative consultation requested by: Tim Casarez MD    Date of Surgery: 11/14/2024    Procedure(s):  Right total knee arthroplasty  Indication: Primary osteoarthritis of right knee    Relevant Problems   No relevant active problems       NPO:  Last Liquid Consumption Date: 11/13/24  Last Liquid Consumption Time: 2200  Last Solid Consumption Date: 11/13/24  Last Solid Consumption Time: 2200  Last Liquid Consumption Date: 11/13/24          History Review:  Patient Active Problem List    Diagnosis Date Noted    S/P hip replacement, left 06/28/2023    Temporal arteritis (HCC) 06/07/2021    Posterior neck pain 01/27/2021    Cervical spondylosis without myelopathy 01/27/2021    Combined forms of age-related cataract of both eyes 08/21/2017    Disorder of bone and cartilage 02/27/2017    Recurrent UTI 02/27/2017    HSV (herpes simplex virus) infection 02/27/2017    Family history of colon cancer 10/22/2012       Past Medical History:    Congenital spondylolisthesis    DDD (degenerative disc disease), cervical    Diverticulosis of colon (without mention of hemorrhage)    HSV infection    Hx of motion sickness    Immunization, single disease    Prevnar 13    Osteoarthritis    Osteoarthrosis involving or with mention of more than one site, but not specified as generalized    Osteopenia    -1.1, -1.8, -1.8    PONV (postoperative nausea and vomiting)    Screening for cardiovascular condition    score of 58; mild coronary artery plaque (score of 1-99)    Temporal arteritis (HCC)    Tinnitus    Visual impairment    glasses       Past Surgical History:   Procedure Laterality Date    Colonoscopy  08/2006    Colonoscopy  08/03/2009    Performed by JEREMÍAS SOLER at [facility] [place], [facility]    Colonoscopy N/A 08/19/2014    Procedure: COLONOSCOPY, POSSIBLE BIOPSY, POSSIBLE POLYPECTOMY 84922;  Surgeon: Jeremías Soler MD;  Location: [facility], [facility]    Colonoscopy  08/19/2014    repeat in 5 years (FH of colon CA)    Colonoscopy  08/2020    and EGD (per Dr. Flaherty)    Egd N/A 08/06/2020    Procedure: ESOPHAGOGASTRODUODENOSCOPY, COLONOSCOPY, POSSIBLE BIOPSY, POSSIBLE POLYPECTOMY 20084, 83853;  Surgeon: Jeremías Flaherty MD;  Location: [facility], [facility]    Hysterectomy      and BSO    Other surgical history      h/o right knee arthroscopic surgery    Removal of tonsils,10+ y/o      Total abdom hysterectomy      Total hip replacement Left        Prescriptions Prior to Admission[1]  Current Medications and Prescriptions Ordered in Epic[2]    Allergies[3]    Family History   Problem Relation Age of Onset    Other (Other) Mother         atherosclerosis of the kidney    Heart Disorder Brother         MI     Social History     Socioeconomic History    Marital status:     Number of children: 2   Occupational History    Occupation: retired teacher   Tobacco Use    Smoking status: Never    Smokeless tobacco: Never    Tobacco comments:     caffeine--tea 2-3/d; occas diet Coke   Vaping Use    Vaping status: Never Used   Substance and Sexual Activity    Alcohol use: Yes     Alcohol/week: 0.0 standard drinks of alcohol     Comment: rare    Drug use: No    Sexual activity: Never     Partners: Male   Other Topics Concern     Service No    Blood Transfusions No    Caffeine Concern No    Occupational Exposure No    Hobby Hazards No    Sleep Concern No    Stress Concern No    Weight Concern No    Special Diet No    Back Care Yes    Exercise Yes     Comment: walk 1 hour daily    Bike Helmet No    Seat Belt Yes    Self-Exams Yes       Available pre-op labs reviewed.             Vital Signs:  Body mass index is 24.03 kg/m².   height is 1.626 m (5' 4\") and weight is 63.5 kg (140 lb). Her oral temperature is 97.9 °F (36.6 °C). Her blood pressure is 146/75 and her pulse is 82. Her respiration is 20 and oxygen saturation is 95%.   Vitals:    11/12/24  1232 11/14/24 0749   BP:  146/75   Pulse:  82   Resp:  20   Temp:  97.9 °F (36.6 °C)   TempSrc:  Oral   SpO2:  95%   Weight: 62.6 kg (138 lb) 63.5 kg (140 lb)   Height: 1.626 m (5' 4\") 1.626 m (5' 4\")        Anesthesia Evaluation     Patient summary reviewed and Nursing notes reviewed    History of anesthetic complications   Airway   Mallampati: II  TM distance: >3 FB  Neck ROM: full  Dental - Dentition appears grossly intact     Pulmonary - negative ROS and normal exam   Cardiovascular - negative ROS and normal exam    Neuro/Psych - negative ROS     GI/Hepatic/Renal - negative ROS     Endo/Other    (+) arthritis  Abdominal                  Anesthesia Plan:   ASA:  2  Plan:   Spinal  Post-op Pain Management: IV analgesics and Saphenous block  Informed Consent Plan and Risks Discussed With:  Patient  Use of Blood Products Discussed With:  Patient  Blood Product Use Consented    Discussed plan with:  Surgeon and CRNA      I have informed Bettina Harris and/or legal guardian or family member of the nature of the anesthetic plan, benefits, risks including possible dental damage if relevant, major complications, and any alternative forms of anesthetic management.   All of the patient's questions were answered to the best of my ability. The patient desires the anesthetic management as planned.  Khanh Duckworth MD  11/14/2024 8:17 AM  Present on Admission:  **None**           [1]   Medications Prior to Admission   Medication Sig Dispense Refill Last Dose/Taking    carvedilol 6.25 MG Oral Tab Take 1 tablet (6.25 mg total) by mouth 2 (two) times daily.   11/14/2024 Morning    predniSONE 5 MG Oral Tab Take 9 mg by mouth daily.   11/14/2024 Morning    famotidine 20 MG Oral Tab Take 1 tablet (20 mg total) by mouth 2 (two) times daily.   11/14/2024 Morning    acyclovir 400 MG Oral Tab Take 1 tablet (400 mg total) by mouth 2 (two) times daily. 60 tablet 11 11/14/2024 Morning    Calcium Citrate-Vitamin D (CITRACAL/VITAMIN  D) 250-200 MG-UNIT Oral Tab Take two tablets daily (Patient taking differently: Take 2 tablets by mouth in the morning and 2 tablets before bedtime. Patient takes two tablets in AM and one tablet in PM.) 60 tablet 0 10/6/2024    VITAMIN-B COMPLEX OR 1 tab daily   10/6/2024    VITAMIN A 1 TABLET PO DAILY   11/6/2024    VITAMIN C 500 MG OR TABS 1 TABLET  DAILY   10/6/2024    VITAMIN E 400 UNIT OR TABS 1 TABLET PO DAILY   10/6/2024   [2]   Current Facility-Administered Medications Ordered in Epic   Medication Dose Route Frequency Provider Last Rate Last Admin    lactated ringers infusion   Intravenous Continuous Tim Casarez MD 20 mL/hr at 11/14/24 0806 New Bag at 11/14/24 0806    acetaminophen (Tylenol Extra Strength) tab 1,000 mg  1,000 mg Oral Once Tim Casarez MD        metoprolol tartrate (Lopressor) tab 25 mg  25 mg Oral Once PRN Tim Casarez MD        ceFAZolin (Ancef) 2g in 10mL IV syringe premix  2 g Intravenous Once Tim Casarez MD        clonidine-EPINEPHrine-ropivacaine-ketorolac (CERTS) (Duraclon-Adrenalin-Naropin-Toradol) pain cocktail irrigation   Intra-articular Once (Intra-Op) Tim Casarez MD         No current [facility]-ordered outpatient medications on file.   [3]   Allergies  Allergen Reactions    Augmentin [Amoxicillin-Pot Clavulanate] NAUSEA ONLY    Codeine DIZZINESS    Hydrocodone DIZZINESS    Bactrim [Sulfamethoxazole W/Trimethoprim, Co-Trimoxazole]      Can't recall reaction but knows had a problem with bactrim    Ciprofloxacin OTHER (SEE COMMENTS)     Feels \"revved up\"    Erythromycin Base      GI UPSET      Fosamax [Alendronic Acid]      Bony pain; tinnitus    Tramadol FATIGUE

## 2024-11-14 NOTE — PHYSICAL THERAPY NOTE
PHYSICAL THERAPY EVALUATION - INPATIENT     [facility]  Evaluation Date: 11/14/2024  Type of Evaluation: Initial   Physician Order: PT Eval and Treat    Presenting Problem: primary osteoarthritis of right knee, s/p R TKA     Reason for Therapy: Mobility Dysfunction and Discharge Planning    PHYSICAL THERAPY ASSESSMENT   Patient is a 79 year old female admitted 11/14/2024 for primary osteoarthritis of right knee, s/p R TKA. Prior to admission, patient's baseline is Independent with ADLs/iADLs/functional mobility. Patient is currently functioning below baseline with bed mobility, transfers, gait, stair negotiation, standing prolonged periods, and performing household tasks. Patient is requiring supervision and contact guard assist as a result of the following impairments: decreased functional strength, decreased endurance/aerobic capacity, pain, impaired standing balance, decreased muscular endurance, and limited R knee ROM. Based on patient and family demonstrating good understanding of post operative protocol and safe functional mobility observed this date, no further skilled IP PT required at this time. RN made aware. Patient and family agreeable. Safe for DC to home from a PT standpoint.     PLAN  Patient has been evaluated and presents with no skilled Physical Therapy needs at this time.  Patient will be discharged from Physical Therapy services. Please re-order if a new functional limitation presents during this admission.    PT Device Recommendation: Rolling walker    PHYSICAL THERAPY MEDICAL/SOCIAL HISTORY     Problem List  Active Problems:    * No active hospital problems. *    HOME SITUATION  Type of Home: House  Home Layout: Multi-level  Stairs to Enter : 3   Railing: Yes    Lives With: Alone (daughter staying with patient upon discharge)    Patient Regularly Uses: None (owns rolling walker)      SUBJECTIVE  \"Bless you heart.\"    PHYSICAL THERAPY EXAMINATION   OBJECTIVE  Precautions: Limb  alert - right  Fall Risk: Standard fall risk    WEIGHT BEARING RESTRICTION  R Lower Extremity: Weight Bearing as Tolerated    PAIN ASSESSMENT  Rating: Unable to rate  Location: right knee  Management Techniques: Activity promotion;Body mechanics;Repositioning    COGNITION  Overall Cognitive Status:  WFL - within functional limits    RANGE OF MOTION AND STRENGTH ASSESSMENT  Upper extremity ROM and strength are within functional limits   Lower extremity ROM is within functional limits; R knee limited due to pain approx 0-90 degrees  Lower extremity strength is within functional limits; RLE not formally assessed due to pain and recent procedure, approx 3/5    BALANCE  Static Sitting: Good  Dynamic Sitting: Fair +  Static Standing: Fair  Dynamic Standing: Fair -    NEUROLOGICAL FINDINGS  Sensation: residual groin numbness - improving    ACTIVITY TOLERANCE  Pulse: 116 (with activity)  Heart Rate Source: Monitor     BP: 160/78  BP Location: Right arm  BP Method: Automatic  Patient Position: Sitting    O2 WALK  Oxygen Therapy  SPO2% Ambulation on Room Air: 96    AM-PAC '6-Clicks' INPATIENT SHORT FORM - BASIC MOBILITY  How much difficulty does the patient currently have...  Patient Difficulty: Turning over in bed (including adjusting bedclothes, sheets and blankets)?: A Little   Patient Difficulty: Sitting down on and standing up from a chair with arms (e.g., wheelchair, bedside commode, etc.): A Little   Patient Difficulty: Moving from lying on back to sitting on the side of the bed?: A Little   How much help from another person does the patient currently need...   Help from Another: Moving to and from a bed to a chair (including a wheelchair)?: A Little   Help from Another: Need to walk in hospital room?: A Little   Help from Another: Climbing 3-5 steps with a railing?: A Little     AM-PAC Score:  Raw Score: 18   Approx Degree of Impairment: 46.58%   Standardized Score (AM-PAC Scale): 43.63   CMS Modifier (G-Code):  CK    FUNCTIONAL ABILITY STATUS  Functional Mobility/Gait Assessment  Gait Assistance:  (CGA>Supervision as distance increased)  Distance (ft): 50 ft and 150 ft  Assistive Device: Rolling walker  Pattern: Shuffle;R Decreased stance time (decreased eduardo speed, decreased step length, step to gait pattern.)  Stairs: Stairs  How Many Stairs: 4  Device: 2 Rails  Assist: Contact guard assist  Pattern: Ascend and Descend  Ascend and Descend : Step to  Supine to Sit: supervision  Sit to Supine: supervision  Sit to Stand: contact guard assist progressing to supervision with increased trials from EOB x 3 and toilet. Patient tolerated alternated marching in place with BUE support on RW and CGA prior to mobilization; no knee buckling or LOB noted.    Exercise/Education Provided:  Bed mobility  Body mechanics  Energy conservation  Functional activity tolerated  Gait training  Posture  Lower therapeutic exercise:  Alternating marching  LAQ  SLR  Transfer training  Stair negotiation    Skilled Therapy Provided: Patient is RLE WBAT. RN approved activity. Patient received supine in bed with daughter present in room; agreeable to participate in therapy on this date. Patient educated on POC. Education on physiological benefits of mobility post operatively. Discussed TKA protocol, weight bearing status, precautions and education on therapeutic exercises. Patient reporting right knee pain, not quantified per the pain scale.    The patient's Approx Degree of Impairment: 46.58% has been calculated based on documentation in the [facility] '6 clicks' Inpatient Basic Mobility Short Form.  Research supports that patients with this level of impairment may benefit from HHPT.  Final disposition will be made by interdisciplinary medical team.    Patient End of Session: In bed;Needs met;Call light within reach;RN aware of session/findings;All patient questions and concerns addressed;Hospital anti-slip socks;Family present    Patient was able to  achieve the following ...   Patient able to transfer   Supervision, safe for home    Patient able to ambulate on level surfaces   Supervision with RW, safe for home     Patient Evaluation Complexity Level:  History Low - no personal factors and/or co-morbidities   Examination of body systems Low -  addressing 1-2 elements   Clinical Presentation Low- Stable   Clinical Decision Making  Low Complexity     Gait Training: 15 minutes  Therapeutic Activity:  15 minutes

## 2024-11-14 NOTE — DISCHARGE INSTRUCTIONS
Keep dressing intact for 1 week, changing only if >50% saturated  Change dressing in 1 week if not previously changed  May shower with water-proof dressing intact  Keep leg elevated when not ambulating, no pillow directly under knee, keep leg straight with foot higher than knee.  Use pain medication as provided  Use over the counter stool softener daily while taking pain medication - Colace 100mg twice a day AND Senokot 17.2mg every night. If no bowel movement in 2 days, obtain Magnesium Citrate and take as directed.  Start 81mg Aspirin tonight with dinner. Continue 81mg twice a day for 6 weeks.  Contact number provided in 24 hours if you have not heard from home health services for RN and physical therapy home visits  Follow-up in office in 2 weeks as scheduled      HOME INSTRUCTIONS  AMBSURG HOME CARE INSTRUCTIONS: POST-OP ANESTHESIA  The medication that you received for sedation or general anesthesia can last up to 24 hours. Your judgment and reflexes may be altered, even if you feel like your normal self.      We Recommend:   Do not drive any motor vehicle or bicycle   Avoid mowing the lawn, playing sports, or working with power tools/applicances (power saws, electric knives or mixers)   That you have someone stay with you on your first night home   Do not drink alcohol or take sleeping pills or tranquilizers   Do not sign legal documents within 24 hours of your procedure   If you had a nerve block for your surgery, take extra care not to put any pressure on your arm or hand for 24 hours    It is normal:  For you to have a sore throat if you had a breathing tube during surgery (while you were asleep!). The sore throat should get better within 48 hours. You can gargle with warm salt water (1/2 tsp in 4 oz warm water) or use a throat lozenge for comfort  To feel muscle aches or soreness especially in the abdomen, chest or neck. The achy feeling should go away in the next 24 hours  To feel weak, sleepy or \"wiped  out\". Your should start feeling better in the next 24 hours.   To experience mild discomforts such as sore lip or tongue, headache, cramps, gas pains or a bloated feeling in your abdomen.   To experience mild back pain or soreness for a day or two if you had spinal or epidural anesthesia.   If you had laparoscopic surgery, to feel shoulder pain or discomfort on the day of surgery.   For some patients to have nausea after surgery/anesthesia    If you feel nausea or experience vomiting:   Try to move around less.   Eat less than usual or drink only liquids until the next morning   Nausea should resolve in about 24 hours    If you have a problem when you are at home:    Call your surgeons office   Discharge Instructions: After Your Surgery  You’ve just had surgery. During surgery, you were given medicine called anesthesia to keep you relaxed and free of pain. After surgery, you may have some pain or nausea. This is common. Here are some tips for feeling better and getting well after surgery.   Going home  Your healthcare provider will show you how to take care of yourself when you go home. They'll also answer your questions. Have an adult family member or friend drive you home. For the first 24 hours after your surgery:   Don't drive or use heavy equipment.  Don't make important decisions or sign legal papers.  Take medicines as directed.  Don't drink alcohol.  Have someone stay with you, if needed. They can watch for problems and help keep you safe.  Be sure to go to all follow-up visits with your healthcare provider. And rest after your surgery for as long as your provider tells you to.   Coping with pain  If you have pain after surgery, pain medicine will help you feel better. Take it as directed, before pain becomes severe. Also, ask your healthcare provider or pharmacist about other ways to control pain. This might be with heat, ice, or relaxation. And follow any other instructions your surgeon or nurse gives you.       Stay on schedule with your medicine.     Tips for taking pain medicine  To get the best relief possible, remember these points:   Pain medicines can upset your stomach. Taking them with a little food may help.  Most pain relievers taken by mouth need at least 20 to 30 minutes to start to work.  Don't wait till your pain becomes severe before you take your medicine. Try to time your medicine so that you can take it before starting an activity. This might be before you get dressed, go for a walk, or sit down for dinner.  Constipation is a common side effect of some pain medicines. Call your healthcare provider before taking any medicines such as laxatives or stool softeners to help ease constipation. Also ask if you should skip any foods. Drinking lots of fluids and eating foods such as fruits and vegetables that are high in fiber can also help. Remember, don't take laxatives unless your surgeon has prescribed them.  Drinking alcohol and taking pain medicine can cause dizziness and slow your breathing. It can even be deadly. Don't drink alcohol while taking pain medicine.  Pain medicine can make you react more slowly to things. Don't drive or run machinery while taking pain medicine.  Your healthcare provider may tell you to take acetaminophen to help ease your pain. Ask them how much you're supposed to take each day. Acetaminophen or other pain relievers may interact with your prescription medicines or other over-the-counter (OTC) medicines. Some prescription medicines have acetaminophen and other ingredients in them. Using both prescription and OTC acetaminophen for pain can cause you to accidentally overdose. Read the labels on your OTC medicines with care. This will help you to clearly know the list of ingredients, how much to take, and any warnings. It may also help you not take too much acetaminophen. If you have questions or don't understand the information, ask your pharmacist or healthcare provider to  explain it to you before you take the OTC medicine.   Managing nausea  Some people have an upset stomach (nausea) after surgery. This is often because of anesthesia, pain, or pain medicine, less movement of food in the stomach, or the stress of surgery. These tips will help you handle nausea and eat healthy foods as you get better. If you were on a special food plan before surgery, ask your healthcare provider if you should follow it while you get better. Check with your provider on how your eating should progress. It may depend on the surgery you had. These general tips may help:   Don't push yourself to eat. Your body will tell you when to eat and how much.  Start off with clear liquids and soup. They're easier to digest.  Next try semi-solid foods as you feel ready. These include mashed potatoes, applesauce, and gelatin.  Slowly move to solid foods. Don’t eat fatty, rich, or spicy foods at first.  Don't force yourself to have 3 large meals a day. Instead eat smaller amounts more often.  Take pain medicines with a small amount of solid food, such as crackers or toast. This helps prevent nausea.  When to call your healthcare provider  Call your healthcare provider right away if you have any of these:   You still have too much pain, or the pain gets worse, after taking the medicine. The medicine may not be strong enough. Or there may be a complication from the surgery.  You feel too sleepy, dizzy, or groggy. The medicine may be too strong.  Side effects such as nausea or vomiting. Your healthcare provider may advise taking other medicines to .  Skin changes such as rash, itching, or hives. This may mean you have an allergic reaction. Your provider may advise taking other medicines.  The incision looks different (for instance, part of it opens up).  Bleeding or fluid leaking from the incision site, and weren't told to expect that.  Fever of 100.4°F (38°C) or higher, or as directed by your provider.  Call 911  Call  911 right away if you have:   Trouble breathing  Facial swelling    If you have obstructive sleep apnea   You were given anesthesia medicine during surgery to keep you comfortable and free of pain. After surgery, you may have more apnea spells because of this medicine and other medicines you were given. The spells may last longer than normal.    At home:  Keep using the continuous positive airway pressure (CPAP) device when you sleep. Unless your healthcare provider tells you not to, use it when you sleep, day or night. CPAP is a common device used to treat obstructive sleep apnea.  Talk with your provider before taking any pain medicine, muscle relaxants, or sedatives. Your provider will tell you about the possible dangers of taking these medicines.  Contact your provider if your sleeping changes a lot even when taking medicines as directed.  [employer] last reviewed this educational content on 10/1/2021  © 4020-0744 [employer]. All rights reserved. This information is not intended as a substitute for professional medical care. Always follow your healthcare professional's instructions.      Sometimes managing your health at home requires assistance.  The [facility] team has recognized your preference to use [facility].  They can be reached by phone at [phone].  The fax number for your reference is [phone].  A representative from the home health agency will contact you or your family to schedule your first visit.

## 2024-11-14 NOTE — ANESTHESIA POSTPROCEDURE EVALUATION
Patient: Bettina Harris    Procedure Summary       Date: 11/14/24 Room / Location: [facility] MAIN OR  / [facility] MAIN OR    Anesthesia Start: 0925 Anesthesia Stop: 1123    Procedure: Right total knee arthroplasty (Right: Knee) Diagnosis: (Primary osteoarthritis of right knee)    Surgeons: Tim Casarez MD Anesthesiologist: Khanh Duckworth MD    Anesthesia Type: general ASA Status: 2            Anesthesia Type: general    Vitals Value Taken Time   /72 11/14/24 1219   Temp 97.7 °F (36.5 °C) 11/14/24 1119   Pulse 69 11/14/24 1225   Resp 14 11/14/24 1225   SpO2 92 % 11/14/24 1225   Vitals shown include unfiled device data.    [facility] AN Post Evaluation:   Patient Evaluated in PACU  Patient Participation: complete - patient participated  Level of Consciousness: awake  Pain Management: adequate  Airway Patency:patent  Dental exam unchanged from preop  Yes    Cardiovascular Status: acceptable  Respiratory Status: acceptable  Postoperative Hydration acceptable      Khanh Duckworth MD  11/14/2024 12:26 PM

## 2024-11-14 NOTE — H&P
History & Physical Examination    Patient Name: Bettina Harris  MRN: [id number]  CSN: [id number]  YOB: 1945    Diagnosis: RIGHT KNEE OA    Present Illness: FAILED CONSERVATIVE MEAURES    Prescriptions Prior to Admission[1]  Current Facility-Administered Medications   Medication Dose Route Frequency    lactated ringers infusion   Intravenous Continuous    acetaminophen (Tylenol Extra Strength) tab 1,000 mg  1,000 mg Oral Once    metoprolol tartrate (Lopressor) tab 25 mg  25 mg Oral Once PRN    ceFAZolin (Ancef) 2g in 10mL IV syringe premix  2 g Intravenous Once    clonidine-EPINEPHrine-ropivacaine-ketorolac (CERTS) (Duraclon-Adrenalin-Naropin-Toradol) pain cocktail irrigation   Intra-articular Once (Intra-Op)       Allergies: Allergies[2]    Past Medical History:    Congenital spondylolisthesis    DDD (degenerative disc disease), cervical    Diverticulosis of colon (without mention of hemorrhage)    HSV infection    Hx of motion sickness    Immunization, single disease    Prevnar 13    Osteoarthritis    Osteoarthrosis involving or with mention of more than one site, but not specified as generalized    Osteopenia    -1.1, -1.8, -1.8    PONV (postoperative nausea and vomiting)    Screening for cardiovascular condition    score of 58; mild coronary artery plaque (score of 1-99)    Temporal arteritis (HCC)    Tinnitus    Visual impairment    glasses     Past Surgical History:   Procedure Laterality Date    Colonoscopy  08/2006    Colonoscopy  08/03/2009    Performed by JEREMÍAS SOLER at [facility]    Colonoscopy N/A 08/19/2014    Procedure: COLONOSCOPY, POSSIBLE BIOPSY, POSSIBLE POLYPECTOMY 59866;  Surgeon: Jeremías Soler MD;  Location: [facility]    Colonoscopy  08/19/2014    repeat in 5 years (FH of colon CA)    Colonoscopy  08/2020    and EGD (per Dr. Soler)    Egd N/A 08/06/2020    Procedure: ESOPHAGOGASTRODUODENOSCOPY, COLONOSCOPY, POSSIBLE BIOPSY, POSSIBLE POLYPECTOMY 30735,  40092;  Surgeon: Jeremías Flaherty MD;  Location: [facility], [facility]    Hysterectomy      and BSO    Other surgical history      h/o right knee arthroscopic surgery    Removal of tonsils,10+ y/o      Total abdom hysterectomy      Total hip replacement Left      Family History   Problem Relation Age of Onset    Other (Other) Mother         atherosclerosis of the kidney    Heart Disorder Brother         MI     Social History     Tobacco Use    Smoking status: Never    Smokeless tobacco: Never    Tobacco comments:     caffeine--tea 2-3/d; occas diet Coke   Substance Use Topics    Alcohol use: Yes     Alcohol/week: 0.0 standard drinks of alcohol     Comment: rare       SYSTEM Check if Review is Normal Check if Physical Exam is Normal If not normal, please explain:   HEENT [ x] [ x]    NECK & BACK [ x] [ x]    HEART [ x] [ x]    LUNGS [ x] [ x]    ABDOMEN [ x] [ x]    UROGENITAL [ x] [ x]    EXTREMITIES [ ] [ ] R +JLT, +effusion   OTHER        [ x ] I have discussed the risks and benefits and alternatives with the patient/family.  They understand and agree to proceed with plan of care.  [ x ] I have reviewed the History and Physical done within the last 30 days.  Any changes noted above.    PLAN: RIGHT TKA    Tim Casarez MD  11/14/2024  9:12 AM           [1]   Medications Prior to Admission   Medication Sig Dispense Refill Last Dose/Taking    carvedilol 6.25 MG Oral Tab Take 1 tablet (6.25 mg total) by mouth 2 (two) times daily.   11/14/2024 Morning    predniSONE 5 MG Oral Tab Take 9 mg by mouth daily.   11/14/2024 Morning    famotidine 20 MG Oral Tab Take 1 tablet (20 mg total) by mouth 2 (two) times daily.   11/14/2024 Morning    acyclovir 400 MG Oral Tab Take 1 tablet (400 mg total) by mouth 2 (two) times daily. 60 tablet 11 11/14/2024 Morning    Calcium Citrate-Vitamin D (CITRACAL/VITAMIN D) 250-200 MG-UNIT Oral Tab Take two tablets daily (Patient taking differently: Take 2 tablets by mouth in the morning and 2  tablets before bedtime. Patient takes two tablets in AM and one tablet in PM.) 60 tablet 0 10/6/2024    VITAMIN-B COMPLEX OR 1 tab daily   10/6/2024    VITAMIN A 1 TABLET PO DAILY   11/6/2024    VITAMIN C 500 MG OR TABS 1 TABLET  DAILY   10/6/2024    VITAMIN E 400 UNIT OR TABS 1 TABLET PO DAILY   10/6/2024   [2]   Allergies  Allergen Reactions    Augmentin [Amoxicillin-Pot Clavulanate] NAUSEA ONLY    Codeine DIZZINESS    Hydrocodone DIZZINESS    Bactrim [Sulfamethoxazole W/Trimethoprim, Co-Trimoxazole]      Can't recall reaction but knows had a problem with bactrim    Ciprofloxacin OTHER (SEE COMMENTS)     Feels \"revved up\"    Erythromycin Base      GI UPSET      Fosamax [Alendronic Acid]      Bony pain; tinnitus    Tramadol FATIGUE

## 2024-11-14 NOTE — ANESTHESIA PROCEDURE NOTES
Spinal Block    Performed by: Khanh Duckworth MD  Authorized by: Khanh Duckworth MD      General Information and Staff    Start Time:   Anesthesiologist:  Khanh Duckworth MD  Performed by:  Anesthesiologist  Patient Location:  OR  Site identification: surface landmarks    Preanesthetic Checklist: patient identified, IV checked, risks and benefits discussed, monitors and equipment checked, pre-op evaluation, timeout performed, anesthesia consent and sterile technique used      Procedure Details    Patient Position:  Sitting  Prep: ChloraPrep    Monitoring:  Cardiac monitor  Approach:  Midline  Location:  L3-4  Injection Technique:  Single-shot    Needle    Needle Type:  Pencil-tip  Needle Gauge:  24 G  Needle Length:  3.5 in    Assessment    Sensory Level:   Events: clear CSF, CSF aspirated, well tolerated and blood negative      Additional Comments

## 2024-11-14 NOTE — OPERATIVE REPORT
PATIENT'S NAME: Bettina Harris    ATTENDING PHYSICIAN: Tim Casarez MD   OPERATING PHYSICIAN: Tim Casarez MD   PATIENT ACCOUNT#: [id number]   MEDICAL RECORD #: [id number]   YOB: 1945          ADMISSION DATE: 11/14/2024           OPERATION DATE: 11/14/2024       OPERATIVE REPORT        PREOPERATIVE DIAGNOSIS: Right knee osteoarthritis.  POSTOPERATIVE DIAGNOSIS:  Right knee osteoarthritis.  PROCEDURE:  Robotic assisted right total knee arthroplasty.     COMPLICATIONS:  None.     ANESTHESIA:  Spinal plus adductor block.     SPECIMENS REMOVED:  Bony components sent to Pathology.     IMPLANTS USED:  Heladio Persona femoral component size 9 right narrow CR; tibial component, size E with stem; all poly patella, size  35; articular insert, size 10.       TOURNIQUET TIME:  approx 59 minutes.     BLOOD LOSS:  Approximately 100 mL.     ASSISTANTS:  Tonja Webber PA-C, and SHAILESH Valencia.       INDICATIONS:  The patient is a pleasant 79-year-old woman who has failed nonoperative treatment of righ knee osteoarthritis.  We discussed risks and benefits of operative intervention going forward with the surgery to include infection, stiffness, neurovascular injury, anesthetic risk, continued pain, possible need for further surgery, DVT, PE. She understood these risks and desired to proceed.     OPERATIVE TECHNIQUE:  After adequate induction of spinal anesthesia and adductor block, right lower extremity was prepped and draped in the usual sterile fashion after application of well-padded tourniquet to the right upper thigh.  Prior to the case, the leg was exsanguinated, tourniquet taken up to 250 mmHg.  At this point, a standard midline incision was made, followed by a medial parapatellar arthrotomy.  Patella was everted and knee was flexed.  Fat pad was removed.  ACL was removed.  At this time, tracking pins were placed.  The robotic arm was then used to placed distal femoral pins, drill 4-in-1  cutting holes, and place proximal tibial pins.  Distal cut was made.  A 4-in-1 cutting block was placed.  Anterior, posterior, and chamfer cuts were then made.  PCL retractor was used to gain access to the proximal tibia, and menisci were then removed at this time. Proximal tibia cut was made.  Flexion-extension gaps were found to be stable at approximately 10 mm.  At this time, a tibial tray was placed in appropriate rotation and pinned.  Femoral component was placed and pinned.  Trial polyethylene was placed.  The patella was rongeured free of osteophytes, reamed down to appropriate size.  A 35 mm patellar button was then medialized once peg holes were drilled.  Knee was taken through range of motion and the patella was found to track well.  At this time, the femur was prepared with a drill.  Tibia was prepared with a drill and punch.  All trials were removed.  Bony surfaces were irrigated while cement was mixed and allowed to fully harden.  Cement was first placed about the tibia, followed by the femur, then the patella.  Components were then placed in this order, followed by a trial insert.  Excess cement was removed with a scraper.  Once the cement hardened, the trial polyethylene was removed.  The final polyethylene was placed after appropriate irrigation.  Knee was irrigated thoroughly one additional time.  The arthrotomy was closed with #1 Ethibond, subcutaneous tissue with 2-0 Vicryl, skin with staples.  It should be noted that prior to closure, pain cocktail was used for local anesthetic.  Sterile dressings were applied.  The patient tolerated the procedure well.  She was taken to PACU in stable condition.  Please note that prior to the case a time-out was completed, correct site was identified, and preoperative antibiotics and TXA were given.  Please also note that assistants were required for the case to help with different sawing and drilling techniques.      Tim Casarez MD

## 2024-11-14 NOTE — OCCUPATIONAL THERAPY NOTE
OCCUPATIONAL THERAPY EVALUATION - INPATIENT     Room Number: [facility]  Evaluation Date: 11/14/2024  Type of Evaluation: Quick Eval  Presenting Problem: s/p R TKA by Dr. Casarez 11/14  Hx L AMAURY (anterior) June 2023     Physician Order: IP Consult to Occupational Therapy  Reason for Therapy: ADL/IADL Dysfunction and Discharge Planning    OCCUPATIONAL THERAPY ASSESSMENT   Patient is a 79 year old female admitted 11/14/2024 s/p R TKA by Dr. Casarez.  Prior to admission, patient's baseline is independent with ADLs and fx mobility/transfers at baseline. Patient is currently functioning near baseline with  ADLs and fx mobility/transfers . Anticipate patient will be able to safely discharge home with additional support when medically cleared.    PLAN  Patient has been evaluated and presents with no skilled Occupational Therapy needs  at this time.  Patient will be discharged from Occupational Therapy services. Please re-order if a new functional limitation presents during this admission.    OCCUPATIONAL THERAPY MEDICAL/SOCIAL HISTORY   Problem List  Active Problems:    * No active hospital problems. *    HOME SITUATION  Type of Home: House  Home Layout: Multi-level  Lives With: Alone  Toilet and Equipment: Toilet riser with arms  Shower/Tub and Equipment: -- (spongebathes)  Patient Regularly Uses: -- (owns RW)  Stairs in Home: 3 JALYN with railing    SUBJECTIVE  Patient agreeable to OT treatment session.    OCCUPATIONAL THERAPY EXAMINATION    OBJECTIVE  Precautions: None  Fall Risk: Standard fall risk    WEIGHT BEARING RESTRICTION  R Lower Extremity: Weight Bearing as Tolerated    PAIN ASSESSMENT  Rating: -- (not rated)  Location: R knee  Management Techniques: Body mechanics; Activity promotion; Nurse notified; Ice    ACTIVITY TOLERANCE  Pulse: 116 (with ax)  Heart Rate Source: Monitor     BP: 160/78  BP Location: Right arm  BP Method: Automatic  Patient Position: Sitting    O2 SATURATIONS  Oxygen Therapy  SPO2%  Ambulation on Room Air: 96    COGNITION  Overall Cognitive Status:  WFL - within functional limits    SENSATION  Residual n/t in groin, improving    RANGE OF MOTION   Upper extremity ROM is within functional limits     STRENGTH ASSESSMENT  Upper extremity strength is within functional limits     COORDINATION  Gross Motor: WFL   Fine Motor: WFL     ACTIVITIES OF DAILY LIVING ASSESSMENT  AM-PAC ‘6-Clicks’ Inpatient Daily Activity Short Form  How much help from another person does the patient currently need…  -   Putting on and taking off regular lower body clothing?: A Little  -   Bathing (including washing, rinsing, drying)?: A Little  -   Toileting, which includes using toilet, bedpan or urinal? : A Little  -   Putting on and taking off regular upper body clothing?: None  -   Taking care of personal grooming such as brushing teeth?: None  -   Eating meals?: None    AM-PAC Score:  Score: 21  Approx Degree of Impairment: 32.79%  Standardized Score (AM-PAC Scale): 44.27  CMS Modifier (G-Code): CJ    BED MOBILITY  Supine to Sit: supervision  Sit to Supine: supervision    FUNCTIONAL TRANSFER ASSESSMENT  Sit to Stand from EOB: supervision  Toilet Transfer: supervision    FUNCTIONAL MOBILITY  supervision for [facility] fx mobility using RW    ACTIVITIES OF DAILY LIVING  Eating: independent (per obs)   Grooming: supervision standing at sink   UB Dressing: independent (per obs)   LB Dressing: supervision  Toileting: supervision    EDUCATION PROVIDED  Patient Education : Role of Occupational Therapy; Plan of Care; Discharge Recommendations; Functional Transfer Techniques; Fall Prevention; Posture/Positioning; Energy Conservation; Proper Body Mechanics; Weight Bear Status  Patient's Response to Education: Verbalized Understanding; Returned Demonstration    The patient's Approx Degree of Impairment: 32.79% has been calculated based on documentation in the [facility] '6 clicks' Inpatient Daily Activity Short Form.  Research supports  that patients with this level of impairment may benefit from discharge home without skilled OT needs.  Final disposition will be made by interdisciplinary medical team.    Patient End of Session: In bed;Needs met;Call light within reach;RN aware of session/findings;All patient questions and concerns addressed;Hospital anti-slip socks;Family present    Patient was able to achieve the following ...   Patient able to toilet transfer   SUP    Patient able to dress lower extremities   SUP    Patient/Caregiver able to demonstrate safety with ADLS  at previous functional level     Patient Evaluation Complexity Level:   Occupational Profile/Medical History LOW - Brief history including review of medical or therapy records    Specific performance deficits impacting engagement in ADL/IADL LOW  1 - 3 performance deficits    Client Assessment/Performance Deficits MODERATE - Comorbidities and min to mod modifications of tasks    Clinical Decision Making LOW - Analysis of occupational profile, problem-focused assessments, limited treatment options    Overall Complexity LOW     OT Session Time  Self-Care Home Management: 15 minutes  Therapeutic Activity: 11 minutes    CHRISTIANE Moore/L  [facility]  #[id number]

## 2025-02-13 ENCOUNTER — IMAGING SERVICES (OUTPATIENT)
Dept: OTHER | Age: 80
End: 2025-02-13

## 2025-03-28 ENCOUNTER — IMAGING SERVICES (OUTPATIENT)
Dept: OTHER | Age: 80
End: 2025-03-28

## 2025-03-31 ENCOUNTER — HOSPITAL ENCOUNTER (EMERGENCY)
Age: 80
Discharge: HOME | End: 2025-03-31
Attending: STUDENT IN AN ORGANIZED HEALTH CARE EDUCATION/TRAINING PROGRAM

## 2025-03-31 ENCOUNTER — APPOINTMENT (OUTPATIENT)
Dept: CT IMAGING | Age: 80
End: 2025-03-31
Attending: STUDENT IN AN ORGANIZED HEALTH CARE EDUCATION/TRAINING PROGRAM

## 2025-03-31 VITALS
RESPIRATION RATE: 16 BRPM | WEIGHT: 141.31 LBS | TEMPERATURE: 98.2 F | DIASTOLIC BLOOD PRESSURE: 70 MMHG | HEIGHT: 64 IN | BODY MASS INDEX: 24.13 KG/M2 | HEART RATE: 80 BPM | SYSTOLIC BLOOD PRESSURE: 130 MMHG | OXYGEN SATURATION: 97 %

## 2025-03-31 DIAGNOSIS — I71.21 ANEURYSM OF ASCENDING AORTA WITHOUT RUPTURE (CMD): Primary | ICD-10-CM

## 2025-03-31 LAB
ABO + RH BLD: NORMAL
ALBUMIN SERPL-MCNC: 3.9 G/DL (ref 3.4–5)
ALBUMIN/GLOB SERPL: 1 {RATIO} (ref 1–2.4)
ALP SERPL-CCNC: 56 UNITS/L (ref 45–117)
ALT SERPL-CCNC: 23 UNITS/L
ANION GAP SERPL CALC-SCNC: 11 MMOL/L (ref 7–19)
APTT PPP: 29 SEC (ref 22–32)
AST SERPL-CCNC: 20 UNITS/L
BASOPHILS # BLD: 0 K/MCL (ref 0–0.3)
BASOPHILS NFR BLD: 0 %
BILIRUB SERPL-MCNC: 0.5 MG/DL (ref 0.2–1)
BLD GP AB SCN SERPL QL GEL: NEGATIVE
BUN SERPL-MCNC: 15 MG/DL (ref 6–20)
BUN/CREAT SERPL: 27 (ref 7–25)
CALCIUM SERPL-MCNC: 9.9 MG/DL (ref 8.4–10.2)
CHLORIDE SERPL-SCNC: 104 MMOL/L (ref 97–110)
CO2 SERPL-SCNC: 23 MMOL/L (ref 21–32)
CREAT SERPL-MCNC: 0.56 MG/DL (ref 0.51–0.95)
DEPRECATED RDW RBC: 51.8 FL (ref 39–50)
EGFRCR SERPLBLD CKD-EPI 2021: >90 ML/MIN/{1.73_M2}
EOSINOPHIL # BLD: 0.1 K/MCL (ref 0–0.5)
EOSINOPHIL NFR BLD: 1 %
ERYTHROCYTE [DISTWIDTH] IN BLOOD: 14.9 % (ref 11–15)
FASTING DURATION TIME PATIENT: ABNORMAL H
GLOBULIN SER-MCNC: 4.1 G/DL (ref 2–4)
GLUCOSE SERPL-MCNC: 103 MG/DL (ref 70–99)
HCT VFR BLD CALC: 45 % (ref 36–46.5)
HGB BLD-MCNC: 14.9 G/DL (ref 12–15.5)
IMM GRANULOCYTES # BLD AUTO: 0 K/MCL (ref 0–0.2)
IMM GRANULOCYTES # BLD: 0 %
INR PPP: 1.1
LYMPHOCYTES # BLD: 1.1 K/MCL (ref 1–4)
LYMPHOCYTES NFR BLD: 13 %
MCH RBC QN AUTO: 30.8 PG (ref 26–34)
MCHC RBC AUTO-ENTMCNC: 33.1 G/DL (ref 32–36.5)
MCV RBC AUTO: 93.2 FL (ref 78–100)
MONOCYTES # BLD: 0.5 K/MCL (ref 0.3–0.9)
MONOCYTES NFR BLD: 6 %
NEUTROPHILS # BLD: 6.7 K/MCL (ref 1.8–7.7)
NEUTROPHILS NFR BLD: 80 %
NRBC BLD MANUAL-RTO: 0 /100 WBC
NT-PROBNP SERPL-MCNC: 189 PG/ML
PLATELET # BLD AUTO: 273 K/MCL (ref 140–450)
POTASSIUM SERPL-SCNC: 4.4 MMOL/L (ref 3.4–5.1)
PROT SERPL-MCNC: 8 G/DL (ref 6.4–8.2)
PROTHROMBIN TIME: 11.4 SEC (ref 9.7–11.8)
RAINBOW EXTRA TUBES HOLD SPECIMEN: NORMAL
RBC # BLD: 4.83 MIL/MCL (ref 4–5.2)
SODIUM SERPL-SCNC: 134 MMOL/L (ref 135–145)
TROPONIN I SERPL DL<=0.01 NG/ML-MCNC: <4 NG/L
TYPE AND SCREEN EXPIRATION DATE: NORMAL
WBC # BLD: 8.5 K/MCL (ref 4.2–11)

## 2025-03-31 PROCEDURE — 86850 RBC ANTIBODY SCREEN: CPT | Performed by: STUDENT IN AN ORGANIZED HEALTH CARE EDUCATION/TRAINING PROGRAM

## 2025-03-31 PROCEDURE — 83880 ASSAY OF NATRIURETIC PEPTIDE: CPT | Performed by: STUDENT IN AN ORGANIZED HEALTH CARE EDUCATION/TRAINING PROGRAM

## 2025-03-31 PROCEDURE — 80053 COMPREHEN METABOLIC PANEL: CPT | Performed by: STUDENT IN AN ORGANIZED HEALTH CARE EDUCATION/TRAINING PROGRAM

## 2025-03-31 PROCEDURE — 84484 ASSAY OF TROPONIN QUANT: CPT | Performed by: STUDENT IN AN ORGANIZED HEALTH CARE EDUCATION/TRAINING PROGRAM

## 2025-03-31 PROCEDURE — 10002805 HB CONTRAST AGENT: Performed by: STUDENT IN AN ORGANIZED HEALTH CARE EDUCATION/TRAINING PROGRAM

## 2025-03-31 PROCEDURE — 85025 COMPLETE CBC W/AUTO DIFF WBC: CPT | Performed by: STUDENT IN AN ORGANIZED HEALTH CARE EDUCATION/TRAINING PROGRAM

## 2025-03-31 PROCEDURE — 74174 CTA ABD&PLVS W/CONTRAST: CPT

## 2025-03-31 PROCEDURE — 71275 CT ANGIOGRAPHY CHEST: CPT

## 2025-03-31 PROCEDURE — 36415 COLL VENOUS BLD VENIPUNCTURE: CPT | Performed by: STUDENT IN AN ORGANIZED HEALTH CARE EDUCATION/TRAINING PROGRAM

## 2025-03-31 PROCEDURE — 85610 PROTHROMBIN TIME: CPT | Performed by: STUDENT IN AN ORGANIZED HEALTH CARE EDUCATION/TRAINING PROGRAM

## 2025-03-31 PROCEDURE — 85730 THROMBOPLASTIN TIME PARTIAL: CPT | Performed by: STUDENT IN AN ORGANIZED HEALTH CARE EDUCATION/TRAINING PROGRAM

## 2025-03-31 PROCEDURE — 99284 EMERGENCY DEPT VISIT MOD MDM: CPT

## 2025-03-31 RX ADMIN — IOHEXOL 85 ML: 350 INJECTION, SOLUTION INTRAVENOUS at 12:55

## 2025-03-31 ASSESSMENT — PAIN SCALES - GENERAL: PAINLEVEL_OUTOF10: 0

## 2025-04-07 ENCOUNTER — OFFICE VISIT (OUTPATIENT)
Dept: CARDIOLOGY | Age: 80
End: 2025-04-07

## 2025-04-07 DIAGNOSIS — I71.21 ANEURYSM OF ASCENDING AORTA WITHOUT RUPTURE (CMD): Primary | ICD-10-CM

## 2025-04-07 PROCEDURE — 99211 OFF/OP EST MAY X REQ PHY/QHP: CPT

## (undated) DEVICE — SLIM BODY SKIN STAPLER: Brand: APPOSE ULC

## (undated) DEVICE — GAMMEX® PI HYBRID SIZE 8, STERILE POWDER-FREE SURGICAL GLOVE, POLYISOPRENE AND NEOPRENE BLEND: Brand: GAMMEX

## (undated) DEVICE — PIN DRL 75MM HDLSS TRCR NXGN

## (undated) DEVICE — TOWEL,OR,DSP,ST,BLUE,DLX,2/PK,40PK/CS: Brand: MEDLINE

## (undated) DEVICE — HOOD: Brand: FLYTE

## (undated) DEVICE — ANTIBACTERIAL UNDYED BRAIDED (POLYGLACTIN 910), SYNTHETIC ABSORBABLE SUTURE: Brand: COATED VICRYL

## (undated) DEVICE — DRAPE ROSA ROBOTIC UN

## (undated) DEVICE — GAMMEX® PI HYBRID SIZE 7, STERILE POWDER-FREE SURGICAL GLOVE, POLYISOPRENE AND NEOPRENE BLEND: Brand: GAMMEX

## (undated) DEVICE — PIN FIX 3.2X150MM FLUT CAS ORTHOSOFT

## (undated) DEVICE — 3M™ COBAN™ NL STERILE NON-LATEX SELF-ADHERENT WRAP, 2084S, 4 IN X 5 YD (10 CM X 4,5 M), 18 ROLLS/CASE: Brand: 3M™ COBAN™

## (undated) DEVICE — PIN FIX L80MM DIA3.2MM FLUT CAS ORTHOSOFT

## (undated) DEVICE — 3 BONE CEMENT MIXER: Brand: MIXEVAC

## (undated) DEVICE — DISPOSABLE TOURNIQUET CUFF SINGLE BLADDER, DUAL PORT AND QUICK CONNECT CONNECTOR: Brand: COLOR CUFF

## (undated) DEVICE — 60 ML SYRINGE LUER-LOCK TIP: Brand: MONOJECT

## (undated) DEVICE — SOL NACL IRRIG 0.9% 1000ML BTL

## (undated) DEVICE — APPLICATOR PREP 26ML CHG 2% ISO ALC 70%

## (undated) DEVICE — BLADE SAW SAGITTAL 19.5

## (undated) DEVICE — SHEET,DRAPE,53X77,STERILE: Brand: MEDLINE

## (undated) DEVICE — APPLICATOR CHLORAPREP 26ML

## (undated) DEVICE — SUT ETHBND XL 1 30IN OS-8 NABSRB GRN 40MM 1/2

## (undated) DEVICE — ENCORE® LATEX MICRO SIZE 7.5, STERILE LATEX POWDER-FREE SURGICAL GLOVE: Brand: ENCORE

## (undated) DEVICE — Device: Brand: STABLECUT®

## (undated) DEVICE — BLADE RMR 38MM PAT SS W/ PILOT H

## (undated) DEVICE — SOLUTION  .9 3000ML

## (undated) DEVICE — WOUND RETRACTOR AND PROTECTOR: Brand: ALEXIS O WOUND PROTECTOR-RETRACTOR

## (undated) DEVICE — BIPOLAR SEALER 23-112-1 AQM 6.0: Brand: AQUAMANTYS™

## (undated) DEVICE — GAUZE,SPONGE,4"X4",12PLY,WOVEN,NS,LF: Brand: MEDLINE

## (undated) DEVICE — SOLUTION IRRIG 1000ML 0.9% NACL USP BTL

## (undated) DEVICE — SUT VICRYL 2-0 CT-1 J945H

## (undated) DEVICE — SCREW FIX 3.5X48MM HEX HD

## (undated) DEVICE — PAD PERINL PST FOAM DISP FOR AMSCO SURG TBL

## (undated) DEVICE — 3M™ IOBAN™ 2 ANTIMICROBIAL INCISE DRAPE 6651EZ: Brand: IOBAN™ 2

## (undated) DEVICE — ILLU EXAM VERSALIGHT 30D 13IN

## (undated) DEVICE — DRESSING AQUACEL AG SP 3.5X10

## (undated) DEVICE — SUT ETHIBOND 2 V-37 MX69G

## (undated) DEVICE — BANDAGE,GAUZE,BULKEE II,4.5"X4.1YD,STRL: Brand: MEDLINE

## (undated) DEVICE — TRAY SURESTEP 16 BARDEX DRAIN

## (undated) DEVICE — KIT NAVITRACKER KNEE AND SPINE DISP ORTHOSOFT

## (undated) DEVICE — ANTERIOR HIP: Brand: MEDLINE INDUSTRIES, INC.

## (undated) DEVICE — SOLUTION IRRIG 3000ML 0.9% NACL FLX CONT

## (undated) DEVICE — NEEDLE SPNL 18GA L3.5IN W/ QNCKE SHARPER BVL

## (undated) DEVICE — WRAP COOLING KNEE W/ICE PILLOW

## (undated) DEVICE — SCREW ORTH 2.5X25MM FEM HEX PERSONA

## (undated) DEVICE — COTTON UNDERCAST PADDING,REGULAR FINISH: Brand: WEBRIL

## (undated) DEVICE — PROXIMATE SKIN STAPLERS (35 WIDE) CONTAINS 35 STAINLESS STEEL STAPLES (FIXED HEAD): Brand: PROXIMATE

## (undated) DEVICE — NEEDLE HPO 18GA 1.5IN ECLPS

## (undated) DEVICE — SYRINGE 35ML LL TIP

## (undated) DEVICE — SUCTION CANISTER, 3000CC,SAFELINER: Brand: DEROYAL

## (undated) DEVICE — PACK CDS TOTAL KNEE